# Patient Record
Sex: MALE | Race: AMERICAN INDIAN OR ALASKA NATIVE | NOT HISPANIC OR LATINO | Employment: FULL TIME | URBAN - NONMETROPOLITAN AREA
[De-identification: names, ages, dates, MRNs, and addresses within clinical notes are randomized per-mention and may not be internally consistent; named-entity substitution may affect disease eponyms.]

---

## 2017-09-28 ENCOUNTER — AMBULATORY - GICH (OUTPATIENT)
Dept: SCHEDULING | Facility: OTHER | Age: 67
End: 2017-09-28

## 2017-09-29 ENCOUNTER — HOSPITAL ENCOUNTER (OUTPATIENT)
Dept: LAB | Facility: OTHER | Age: 67
End: 2017-09-29
Attending: FAMILY MEDICINE | Admitting: FAMILY MEDICINE

## 2017-10-26 ENCOUNTER — HISTORY (OUTPATIENT)
Dept: EMERGENCY MEDICINE | Facility: OTHER | Age: 67
End: 2017-10-26

## 2017-10-30 ENCOUNTER — AMBULATORY - GICH (OUTPATIENT)
Dept: SCHEDULING | Facility: OTHER | Age: 67
End: 2017-10-30

## 2017-10-31 ENCOUNTER — HISTORY (OUTPATIENT)
Dept: ORTHOPEDICS | Facility: OTHER | Age: 67
End: 2017-10-31

## 2017-10-31 ENCOUNTER — OFFICE VISIT - GICH (OUTPATIENT)
Dept: ORTHOPEDICS | Facility: OTHER | Age: 67
End: 2017-10-31

## 2017-10-31 DIAGNOSIS — M75.02 ADHESIVE CAPSULITIS OF LEFT SHOULDER: ICD-10-CM

## 2017-12-27 NOTE — PROGRESS NOTES
Patient Information     Patient Name MRN Sex Faisal Reyna [id number] Male 1950      Progress Notes by Mike Dill DO at 10/31/2017  9:45 AM     Author:  Mike Dill DO Service:  (none) Author Type:  Physician     Filed:  10/31/2017 10:29 AM Encounter Date:  10/31/2017 Status:  Signed     :  Mike Dill DO (Physician)            Faisal Muñoz was seen in consultation for Dr. Webb for a chief complaint of pain into his left shoulder.    CHIEF COMPLAINT: Faisal Muñoz is a 67 y.o.  male  Chief Complaint     Patient presents with       Consult      left shoulder       HISTORY OF PRESENTING INJURY   History of presenting injury, patient awoke one morning with increased pain in the neck region. This is about 3 weeks ago. It started feeling weak along that area. It was bothering him so much she went to the [facility] in hospital multiple times and then eventually came to our ER. He was given some pain medications at that time and he states that those helped. I was able to get his notes from the [facility] and ER as well as our years for my review. He doesn't recall any injury but he did have surgery in  by Dr. Li. He feels like the shoulder is tightening up.  Description of pain:  moderate aching and discomfort   Radiation of pain: no  Pain course: stable  Worse with: overhead reaching, pushing and pulling  Improved by: OTC meds, Rx meds and rest  History of injection: No  Any PT: Yes    PAST MEDICAL HISTORY:  History of diabetes.    PAST SURGICAL HISTORY:  Recent back surgery, cataract surgery, cardiac stent placement, shoulder surgery on the left.    ORTHOPEDIC FRACTURES AND BROKEN BONES:  As above.    ALLERGIES:  No Known Allergies    CURRENT MEDICATIONS:  Current Outpatient Prescriptions       Medication  Sig Dispense Refill     amLODIPine-benazepril (LOTREL) 10-20 mg capsule Take 10-20 capsules by mouth once daily.       aspirin  "(ASPIR-81) 81 mg enteric coated tablet Take 81 mg by mouth once daily with a meal.       atorvastatin (LIPITOR) 80 mg tablet Take 80 mg by mouth once daily.       blood sugar diagnostic (ACCU-CHEK DANY PLUS TEST STRP) strip As directed.       blood-glucose meter (BLOOD GLUCOSE MONITORING) As directed.       clopidogrel (PLAVIX) 75 mg tablet Take 75 mg by mouth once daily.       gabapentin (NEURONTIN) 300 mg capsule Take 300 mg by mouth 3 times daily.       HYDROcodone-acetaminophen, 5-325 mg, (NORCO) per tablet Take 1-2 tablets by mouth every 4 hours if needed  for Pain Max acetaminophen dose: 4000mg in 24 hrs. 15 tablet 0     LANTUS SOLOSTAR 100 unit/mL (3 mL) pen Inject 28 Units subcutaneous before bedtime. Product desired:LANTUS       metFORMIN (GLUCOPHAGE) 500 mg tablet Take 500 mg by mouth 2 times daily with meals.       metoprolol succinate (TOPROL XL) 50 mg sustained-release tablet Take 50 mg by mouth once daily.       nitroglycerin (NITROSTAT) 0.4 mg sublingual tablet Place 0.4 mg under the tongue every 5 minutes if needed for Chest Pain.       No current facility-administered medications for this visit.      Medications have been reviewed by me and are current to the best of my knowledge and ability.      SOCIAL HISTORY:  Marital Status:   Children: Yes  Occupation: He works at [employer]  Alcohol use:  No  Tobacco use: Smoker: no  Are you or have you used illicit drugs:  no    FAMILY HISTORY:  Parents have passed away history of diabetes, heart disease and hypertension.    REVIEW OF SYSTEMS:  The review of systems as documented in the HPI and on the intake questionnaire, completed by the patient on 10/31/2017 have been reviewed by myself and the pertinent positives and negatives addressed.  The remainder of the complete review of systems was non-contributory.    PHYSICAL EXAM:   /70  Pulse 80  Ht 1.905 m (6' 3\")  Wt 104.3 kg (230 lb)  BMI 28.75 kg/m2 Body mass index is 28.75 " kg/(m^2).    General Appearance: Pleasant male in good appearance, mood and affect.  Alert and orientated times three ( time, date and location).    Skin: Abnormal, well-healed surgical incision sites.    Shoulder:  Motion: Patient struggles with motion up to about 90  passively I can stretch him to 120 with expected discomfort. There is a tightness to his capsule and try to go past that. He struggles with all other motions.  Hawkin's Sign: positive  Neer's Sign: positive  Cross body adduction:  positive  Drop arm test: negative  Weakness at waist level: negative  Bicipital testing: positive  Lift off test:  negative  Velez's test:  negative    Elbow:  Flexion: Normal  Extension: Normal    Hand:  Sensation: Normal  Radial and ulnar blood flow:  Normal    Eyes: Pupils are round.    Ears: Hearing: Intact    Heart: Good capillary refill into his hands pulses are regular.    Lungs: Coarse breath sounds throughout.     Radiographic images from 11/28/13 where independently reviewed and discussed with the patient.      Xray:     X-rays demonstrated osteoarthritic changes about the distal clavicle.    MRI/MRA:    MR ARTHROGRAM OF THE LEFT SHOULDER, 11/27/2013  CLINICAL HISTORY: 63-year-old female with previous history of arthroscopic  acromioplasty and rotator cuff repair performed on 2/13/2009 now with  recurrent pain.  TECHNIQUE: After fluoroscopically guided administration of contrast in the  left shoulder, coronal T1 with fat saturation, T2 with fat saturation and  proton density; axial proton density with fat saturation, T1 with fat  saturation and T2; sagittal T1 with fat saturation and proton density.  FINDINGS: The study is compared to the previous from 12/30/2011.  Moderate degenerative changes are seen about the AC joint including  undersurface spurring, which impinges upon the supraspinatus myotendinous  junction. The midportion and posterior portions of the supraspinatus  tendon are attenuated, and there is  extravasation of a small volume of  contrast into the subacromial space, consistent with focal perforations.  Additionally, there is slight delamination in the more distal fibers in  this region. This lies immediately adjacent to the previous rotator cuff  repair. The rotator cuff, however, otherwise appears to be normal in  overall contour, without evidence of a large tear, no evidence of  retraction. The supraspinatus and infraspinatus muscles demonstrate no  suggestion of acute or subacute abnormality. Mild decrease in expected  volume is seen in the supraspinatus, however, there is no evidence of  significant fatty replacement.  The biceps labral anchor appears intact. Mild tendinosis is seen within  the long head of the biceps tendon as it passes in the bicipital groove.  Subscapularis muscle and tendon are intact aside from mild tendinosis.  Teres minor muscle and tendon are intact.  The anterior and posterior aspects of the labrum appear to be normal in  contour. There is slight irregularity suggesting mild degenerative fraying  involving the cephalad and anterior-superior aspects of the labrum.  Articular cartilage of the glenoid and humeral head appear essentially  normal in overall thickness. There is, however, mild thinning along the  peripheral aspects of the joint space near the insertion of the  supraspinatus and infraspinatus tendons. Neurovascular structures appear  to be grossly normal.  Impression:    1. Essentially intact previous rotator cuff repair, associated with mild  tendinosis involving the posterior fibers of the supraspinatus, associated  with focal full-thickness perforations, resulting in slight extravasation  of contrast into the subacromial space.  2. Osteophytic spurring along the undersurface of the distal clavicle  imparts slight mass effect upon the supraspinatus myotendinous junction.  3. Mild degenerative fraying suggested involving the cephalad and  anterior-superior aspects of  the labrum.  4. Slight decrease in muscle bulk of the supraspinatus without evidence of  significant fatty replacement.  5. Mild tendinosis of the subscapularis tendon as well as the long head of  the biceps tendon.  Romie Lowe M.D.  [facility].  MTS/jsy  D:11/27/2013 T: 11/27/2013  Electronically signed by: ROMIE LOWE MD on 11/28/2013 7:25 AM CST    IMPRESSION:  History of previous left rotator cuff repair with Dr. Li in 2009 with MRI report from 2013 showing some atrophy and changes.  History of opioid agreement.  Adhesive capsulitis left shoulder.  Acromioclavicular arthritis.    PLAN:  Risks, benefits, conservative, surgical and alternatives to treatment where discussed and the patient would like to proceed with conservative measures.  I had a very long discussion with the patient and he understands that I do not prescribe narcotics illicit perform surgery or there is a fracture he verbalizes understanding.  He already has an appointment with medical orthopedics at [place] he will keep that appointment possible injections would be warranted.  Physical therapy would help order was placed.  He was instructed on wall walking.  He was given a handout on adhesive capsulitis he is at its and increased risk due to the fact that he has diabetes.  Questions and concerns answered.  He will follow up with the [place] group.    Mike Dill D.O.  Orthopaedic Surgeon    [facility]  [address] [place] [address]  Phone [phone] (KNEE)  Fax [phone]    This document was created using computer generated templates and voice activated software.    10:20 AM 10/31/2017

## 2017-12-30 NOTE — NURSING NOTE
Patient Information     Patient Name MRN Sex Faisal Reyna [id number] Male 1950      Nursing Note by Vale De León at 10/31/2017  9:45 AM     Author:  Vale De León Service:  (none) Author Type:  (none)     Filed:  10/31/2017  9:55 AM Encounter Date:  10/31/2017 Status:  Signed     :  Vale De León            Patient is here for a consult on his left shoulder pain.  Vale De León LPN .......10/31/2017 9:55 AM

## 2018-01-27 VITALS
HEART RATE: 80 BPM | WEIGHT: 230 LBS | SYSTOLIC BLOOD PRESSURE: 136 MMHG | DIASTOLIC BLOOD PRESSURE: 70 MMHG | BODY MASS INDEX: 28.6 KG/M2 | HEIGHT: 75 IN

## 2018-02-15 ENCOUNTER — DOCUMENTATION ONLY (OUTPATIENT)
Dept: FAMILY MEDICINE | Facility: OTHER | Age: 68
End: 2018-02-15

## 2018-02-15 PROBLEM — M75.02 ADHESIVE CAPSULITIS OF LEFT SHOULDER: Status: ACTIVE | Noted: 2017-10-31

## 2018-02-15 RX ORDER — AMLODIPINE AND BENAZEPRIL HYDROCHLORIDE 10; 20 MG/1; MG/1
10-20 CAPSULE ORAL DAILY
COMMUNITY

## 2018-02-15 RX ORDER — NITROGLYCERIN 0.4 MG/1
0.4 TABLET SUBLINGUAL EVERY 5 MIN PRN
COMMUNITY

## 2018-02-15 RX ORDER — CLOPIDOGREL BISULFATE 75 MG/1
75 TABLET ORAL DAILY
COMMUNITY

## 2018-02-15 RX ORDER — ATORVASTATIN CALCIUM 80 MG/1
80 TABLET, FILM COATED ORAL DAILY
COMMUNITY

## 2018-02-15 RX ORDER — METOPROLOL SUCCINATE 50 MG/1
50 TABLET, EXTENDED RELEASE ORAL DAILY
COMMUNITY

## 2018-02-15 RX ORDER — INSULIN PUMP SYRINGE, 3 ML
EACH MISCELLANEOUS
COMMUNITY

## 2018-02-15 RX ORDER — GABAPENTIN 300 MG/1
300 CAPSULE ORAL 3 TIMES DAILY
COMMUNITY

## 2018-02-15 RX ORDER — ASPIRIN 81 MG/1
81 TABLET ORAL
COMMUNITY

## 2018-02-15 RX ORDER — HYDROCODONE BITARTRATE AND ACETAMINOPHEN 5; 325 MG/1; MG/1
1-2 TABLET ORAL EVERY 4 HOURS PRN
COMMUNITY
Start: 2017-10-26 | End: 2023-10-05

## 2020-10-07 ENCOUNTER — HOSPITAL ENCOUNTER (EMERGENCY)
Facility: OTHER | Age: 70
Discharge: HOME | End: 2020-10-07
Payer: COMMERCIAL

## 2020-10-07 VITALS
HEART RATE: 78 BPM | WEIGHT: 236 LBS | BODY MASS INDEX: 29.34 KG/M2 | DIASTOLIC BLOOD PRESSURE: 76 MMHG | OXYGEN SATURATION: 97 % | HEIGHT: 75 IN | SYSTOLIC BLOOD PRESSURE: 154 MMHG | RESPIRATION RATE: 16 BRPM | TEMPERATURE: 98.3 F

## 2020-10-07 ASSESSMENT — MIFFLIN-ST. JEOR: SCORE: 1911.12

## 2020-10-07 NOTE — ED TRIAGE NOTES
Pt states that he has arthritis in his left hip and gets yearly injections in hip.  Pt states Tuesday morning he was having extreme pain.  Pt states that he is going to set up an appt. On this Monday but needs pain medication until then.  Pt has been using OTC medications but is not helping.

## 2021-09-11 ENCOUNTER — APPOINTMENT (OUTPATIENT)
Dept: GENERAL RADIOLOGY | Facility: OTHER | Age: 71
End: 2021-09-11
Attending: PHYSICIAN ASSISTANT
Payer: COMMERCIAL

## 2021-09-11 ENCOUNTER — HOSPITAL ENCOUNTER (EMERGENCY)
Facility: OTHER | Age: 71
Discharge: HOME | End: 2021-09-11
Attending: PHYSICIAN ASSISTANT | Admitting: PHYSICIAN ASSISTANT
Payer: COMMERCIAL

## 2021-09-11 VITALS
SYSTOLIC BLOOD PRESSURE: 140 MMHG | HEART RATE: 75 BPM | RESPIRATION RATE: 16 BRPM | TEMPERATURE: 98.2 F | DIASTOLIC BLOOD PRESSURE: 70 MMHG | OXYGEN SATURATION: 99 % | WEIGHT: 245 LBS | BODY MASS INDEX: 30.46 KG/M2 | HEIGHT: 75 IN

## 2021-09-11 DIAGNOSIS — K29.00 OTHER ACUTE GASTRITIS WITHOUT HEMORRHAGE: ICD-10-CM

## 2021-09-11 DIAGNOSIS — K59.01 SLOW TRANSIT CONSTIPATION: ICD-10-CM

## 2021-09-11 PROBLEM — I20.89 ANGINA OF EFFORT (H): Status: ACTIVE | Noted: 2021-04-02

## 2021-09-11 PROBLEM — Z87.891 FORMER SMOKER: Status: ACTIVE | Noted: 2019-06-19

## 2021-09-11 PROBLEM — Z79.4 CONTROLLED TYPE 2 DIABETES MELLITUS WITH CIRCULATORY DISORDER, WITH LONG-TERM CURRENT USE OF INSULIN (H): Status: ACTIVE | Noted: 2017-04-05

## 2021-09-11 PROBLEM — Z95.5 S/P DRUG ELUTING CORONARY STENT PLACEMENT: Status: ACTIVE | Noted: 2017-08-11

## 2021-09-11 PROBLEM — E11.59 CONTROLLED TYPE 2 DIABETES MELLITUS WITH CIRCULATORY DISORDER, WITH LONG-TERM CURRENT USE OF INSULIN (H): Status: ACTIVE | Noted: 2017-04-05

## 2021-09-11 PROCEDURE — 250N000013 HC RX MED GY IP 250 OP 250 PS 637: Performed by: PHYSICIAN ASSISTANT

## 2021-09-11 PROCEDURE — 99283 EMERGENCY DEPT VISIT LOW MDM: CPT | Performed by: PHYSICIAN ASSISTANT

## 2021-09-11 PROCEDURE — 74019 RADEX ABDOMEN 2 VIEWS: CPT

## 2021-09-11 RX ORDER — DOCUSATE SODIUM 100 MG/1
200 CAPSULE, LIQUID FILLED ORAL ONCE
Status: COMPLETED | OUTPATIENT
Start: 2021-09-11 | End: 2021-09-11

## 2021-09-11 RX ORDER — DOCUSATE SODIUM 100 MG/1
100 CAPSULE, LIQUID FILLED ORAL 2 TIMES DAILY
Qty: 10 CAPSULE | Refills: 0 | Status: SHIPPED | OUTPATIENT
Start: 2021-09-11 | End: 2021-09-16

## 2021-09-11 RX ORDER — ONDANSETRON 4 MG/1
4 TABLET, ORALLY DISINTEGRATING ORAL ONCE
Status: DISCONTINUED | OUTPATIENT
Start: 2021-09-11 | End: 2021-09-11 | Stop reason: HOSPADM

## 2021-09-11 RX ADMIN — MAGNESIUM HYDROXIDE 30 ML: 400 SUSPENSION ORAL at 15:06

## 2021-09-11 RX ADMIN — DOCUSATE SODIUM 200 MG: 100 CAPSULE, LIQUID FILLED ORAL at 15:06

## 2021-09-11 ASSESSMENT — ENCOUNTER SYMPTOMS
LIGHT-HEADEDNESS: 0
BACK PAIN: 0
NAUSEA: 1
VOMITING: 0
CHILLS: 0
FEVER: 0
TREMORS: 0
STRIDOR: 0
FLANK PAIN: 0
FACIAL ASYMMETRY: 0
EYE PAIN: 0
AGITATION: 0
SEIZURES: 0
ABDOMINAL PAIN: 1
FATIGUE: 0
FACIAL SWELLING: 0
APPETITE CHANGE: 1
CONFUSION: 0
DIZZINESS: 0

## 2021-09-11 ASSESSMENT — MIFFLIN-ST. JEOR: SCORE: 1946.94

## 2021-09-11 NOTE — ED TRIAGE NOTES
Patient presents to ED from home for report of upper abdominal pain that radiates to back that started after eating last night, was seen in [place] ED - was told he needed to have his gallbladder removed and needed IV antibiotics but decided to go home instead. Patient advised to go to [facility] in [place] for hospitalization but decided to come to [facility] to see if he could be treated here.

## 2021-11-04 ENCOUNTER — HOSPITAL ENCOUNTER (EMERGENCY)
Facility: OTHER | Age: 71
Discharge: LEFT BEFORE DISCHARGE | End: 2021-11-04
Attending: PHYSICIAN ASSISTANT | Admitting: PHYSICIAN ASSISTANT
Payer: COMMERCIAL

## 2021-11-04 ENCOUNTER — APPOINTMENT (OUTPATIENT)
Dept: ULTRASOUND IMAGING | Facility: OTHER | Age: 71
End: 2021-11-04
Attending: PHYSICIAN ASSISTANT
Payer: COMMERCIAL

## 2021-11-04 VITALS
TEMPERATURE: 100.5 F | OXYGEN SATURATION: 96 % | WEIGHT: 245 LBS | HEART RATE: 89 BPM | DIASTOLIC BLOOD PRESSURE: 77 MMHG | SYSTOLIC BLOOD PRESSURE: 132 MMHG | RESPIRATION RATE: 20 BRPM | BODY MASS INDEX: 30.62 KG/M2

## 2021-11-04 DIAGNOSIS — K83.09 CHOLANGITIS (H): ICD-10-CM

## 2021-11-04 DIAGNOSIS — R10.11 ABDOMINAL PAIN, RIGHT UPPER QUADRANT: ICD-10-CM

## 2021-11-04 DIAGNOSIS — U07.1 INFECTION DUE TO 2019 NOVEL CORONAVIRUS: ICD-10-CM

## 2021-11-04 LAB
ALBUMIN SERPL-MCNC: 4.2 G/DL (ref 3.5–5.7)
ALP SERPL-CCNC: 125 U/L (ref 34–104)
ALT SERPL W P-5'-P-CCNC: 82 U/L (ref 7–52)
ANION GAP SERPL CALCULATED.3IONS-SCNC: 11 MMOL/L (ref 3–14)
AST SERPL W P-5'-P-CCNC: 124 U/L (ref 13–39)
BASOPHILS # BLD AUTO: 0 10E3/UL (ref 0–0.2)
BASOPHILS NFR BLD AUTO: 0 %
BILIRUB DIRECT SERPL-MCNC: 1.4 MG/DL (ref 0–0.2)
BILIRUB SERPL-MCNC: 2.2 MG/DL (ref 0.3–1)
BILIRUB SERPL-MCNC: 2.3 MG/DL (ref 0.3–1)
BUN SERPL-MCNC: 23 MG/DL (ref 7–25)
CALCIUM SERPL-MCNC: 9.3 MG/DL (ref 8.6–10.3)
CHLORIDE BLD-SCNC: 99 MMOL/L (ref 98–107)
CO2 SERPL-SCNC: 23 MMOL/L (ref 21–31)
CREAT SERPL-MCNC: 1.32 MG/DL (ref 0.7–1.3)
EOSINOPHIL # BLD AUTO: 0.1 10E3/UL (ref 0–0.7)
EOSINOPHIL NFR BLD AUTO: 1 %
ERYTHROCYTE [DISTWIDTH] IN BLOOD BY AUTOMATED COUNT: 13.3 % (ref 10–15)
FLUAV RNA SPEC QL NAA+PROBE: NEGATIVE
FLUBV RNA RESP QL NAA+PROBE: NEGATIVE
GFR SERPL CREATININE-BSD FRML MDRD: 54 ML/MIN/1.73M2
GLUCOSE BLD-MCNC: 142 MG/DL (ref 70–105)
HCT VFR BLD AUTO: 37.5 % (ref 40–53)
HGB BLD-MCNC: 12.9 G/DL (ref 13.3–17.7)
IMM GRANULOCYTES # BLD: 0.1 10E3/UL
IMM GRANULOCYTES NFR BLD: 1 %
LACTATE SERPL-SCNC: 1.6 MMOL/L (ref 0.7–2)
LIPASE SERPL-CCNC: 19 U/L (ref 11–82)
LYMPHOCYTES # BLD AUTO: 0.6 10E3/UL (ref 0.8–5.3)
LYMPHOCYTES NFR BLD AUTO: 5 %
MCH RBC QN AUTO: 29.5 PG (ref 26.5–33)
MCHC RBC AUTO-ENTMCNC: 34.4 G/DL (ref 31.5–36.5)
MCV RBC AUTO: 86 FL (ref 78–100)
MONOCYTES # BLD AUTO: 0.7 10E3/UL (ref 0–1.3)
MONOCYTES NFR BLD AUTO: 6 %
NEUTROPHILS # BLD AUTO: 10.7 10E3/UL (ref 1.6–8.3)
NEUTROPHILS NFR BLD AUTO: 87 %
NRBC # BLD AUTO: 0 10E3/UL
NRBC BLD AUTO-RTO: 0 /100
PLATELET # BLD AUTO: 246 10E3/UL (ref 150–450)
POTASSIUM BLD-SCNC: 4.1 MMOL/L (ref 3.5–5.1)
PROT SERPL-MCNC: 7.4 G/DL (ref 6.4–8.9)
RBC # BLD AUTO: 4.37 10E6/UL (ref 4.4–5.9)
RSV RNA SPEC NAA+PROBE: NEGATIVE
SARS-COV-2 RNA RESP QL NAA+PROBE: POSITIVE
SODIUM SERPL-SCNC: 133 MMOL/L (ref 134–144)
TROPONIN I SERPL-MCNC: 4.6 PG/ML (ref 0–34)
WBC # BLD AUTO: 12.1 10E3/UL (ref 4–11)

## 2021-11-04 PROCEDURE — 93005 ELECTROCARDIOGRAM TRACING: CPT | Performed by: PHYSICIAN ASSISTANT

## 2021-11-04 PROCEDURE — 83605 ASSAY OF LACTIC ACID: CPT | Performed by: PHYSICIAN ASSISTANT

## 2021-11-04 PROCEDURE — 83690 ASSAY OF LIPASE: CPT | Performed by: PHYSICIAN ASSISTANT

## 2021-11-04 PROCEDURE — 82247 BILIRUBIN TOTAL: CPT | Mod: XU | Performed by: PHYSICIAN ASSISTANT

## 2021-11-04 PROCEDURE — 87637 SARSCOV2&INF A&B&RSV AMP PRB: CPT | Performed by: PHYSICIAN ASSISTANT

## 2021-11-04 PROCEDURE — 93010 ELECTROCARDIOGRAM REPORT: CPT | Performed by: INTERNAL MEDICINE

## 2021-11-04 PROCEDURE — 85004 AUTOMATED DIFF WBC COUNT: CPT | Performed by: PHYSICIAN ASSISTANT

## 2021-11-04 PROCEDURE — 250N000011 HC RX IP 250 OP 636: Performed by: PHYSICIAN ASSISTANT

## 2021-11-04 PROCEDURE — 82040 ASSAY OF SERUM ALBUMIN: CPT | Performed by: PHYSICIAN ASSISTANT

## 2021-11-04 PROCEDURE — 99284 EMERGENCY DEPT VISIT MOD MDM: CPT | Performed by: PHYSICIAN ASSISTANT

## 2021-11-04 PROCEDURE — 76705 ECHO EXAM OF ABDOMEN: CPT

## 2021-11-04 PROCEDURE — 96365 THER/PROPH/DIAG IV INF INIT: CPT | Performed by: PHYSICIAN ASSISTANT

## 2021-11-04 PROCEDURE — 36415 COLL VENOUS BLD VENIPUNCTURE: CPT | Performed by: PHYSICIAN ASSISTANT

## 2021-11-04 PROCEDURE — C9803 HOPD COVID-19 SPEC COLLECT: HCPCS | Performed by: PHYSICIAN ASSISTANT

## 2021-11-04 PROCEDURE — 258N000003 HC RX IP 258 OP 636: Performed by: PHYSICIAN ASSISTANT

## 2021-11-04 PROCEDURE — 87040 BLOOD CULTURE FOR BACTERIA: CPT | Performed by: PHYSICIAN ASSISTANT

## 2021-11-04 PROCEDURE — 99285 EMERGENCY DEPT VISIT HI MDM: CPT | Mod: 25 | Performed by: PHYSICIAN ASSISTANT

## 2021-11-04 PROCEDURE — 96375 TX/PRO/DX INJ NEW DRUG ADDON: CPT | Performed by: PHYSICIAN ASSISTANT

## 2021-11-04 PROCEDURE — 84484 ASSAY OF TROPONIN QUANT: CPT | Performed by: PHYSICIAN ASSISTANT

## 2021-11-04 RX ORDER — KETOROLAC TROMETHAMINE 15 MG/ML
15 INJECTION, SOLUTION INTRAMUSCULAR; INTRAVENOUS ONCE
Status: COMPLETED | OUTPATIENT
Start: 2021-11-04 | End: 2021-11-04

## 2021-11-04 RX ORDER — METRONIDAZOLE 500 MG/1
500 TABLET ORAL 2 TIMES DAILY
Qty: 14 TABLET | Refills: 0 | Status: SHIPPED | OUTPATIENT
Start: 2021-11-04 | End: 2021-11-11

## 2021-11-04 RX ORDER — ONDANSETRON 2 MG/ML
4 INJECTION INTRAMUSCULAR; INTRAVENOUS ONCE
Status: COMPLETED | OUTPATIENT
Start: 2021-11-04 | End: 2021-11-04

## 2021-11-04 RX ORDER — CIPROFLOXACIN 500 MG/1
500 TABLET, FILM COATED ORAL 2 TIMES DAILY
Qty: 14 TABLET | Refills: 0 | Status: SHIPPED | OUTPATIENT
Start: 2021-11-04 | End: 2021-11-11

## 2021-11-04 RX ORDER — HYDROMORPHONE HYDROCHLORIDE 1 MG/ML
0.5 INJECTION, SOLUTION INTRAMUSCULAR; INTRAVENOUS; SUBCUTANEOUS ONCE
Status: COMPLETED | OUTPATIENT
Start: 2021-11-04 | End: 2021-11-04

## 2021-11-04 RX ADMIN — SODIUM CHLORIDE 1000 ML: 9 INJECTION, SOLUTION INTRAVENOUS at 15:04

## 2021-11-04 RX ADMIN — HYDROMORPHONE HYDROCHLORIDE 0.5 MG: 1 INJECTION, SOLUTION INTRAMUSCULAR; INTRAVENOUS; SUBCUTANEOUS at 15:04

## 2021-11-04 RX ADMIN — ONDANSETRON 4 MG: 2 INJECTION INTRAMUSCULAR; INTRAVENOUS at 15:05

## 2021-11-04 RX ADMIN — TAZOBACTAM SODIUM AND PIPERACILLIN SODIUM 3.38 G: 375; 3 INJECTION, SOLUTION INTRAVENOUS at 16:48

## 2021-11-04 RX ADMIN — KETOROLAC TROMETHAMINE 15 MG: 15 INJECTION, SOLUTION INTRAMUSCULAR; INTRAVENOUS at 15:05

## 2021-11-04 ASSESSMENT — ENCOUNTER SYMPTOMS
CHILLS: 0
SHORTNESS OF BREATH: 0
WOUND: 0
ABDOMINAL PAIN: 1
BRUISES/BLEEDS EASILY: 0
HEMATURIA: 0
FEVER: 0
CONFUSION: 0
CHEST TIGHTNESS: 0
BACK PAIN: 0

## 2021-11-04 NOTE — DISCHARGE INSTRUCTIONS
Get plenty of fluids and rest.  Be aware you are leaving AGAINST MEDICAL ADVICE.  We did discuss that there is concern for worsening infection called cholangitis.  Take your antibiotics as directed, I did place referral for you to obtain an MRI of your abdomen for further evaluation as well as a referral to follow-up with general surgeon for reassessment.  Please return the ED if there are greatly worsening or concerning symptoms.  You can take ibuprofen to help with your fever and comfort control.

## 2021-11-04 NOTE — ED PROVIDER NOTES
History     Chief Complaint   Patient presents with     Abdominal Pain     HPI  Faisal Muñoz is a 71 year old male who presents to the ED for evaluation abdominal pain. history of constant midline abdominal pain reported by patient. Was seen on labor day and told he had congestion between liver and gallbladder, told to see a surgeon to get gallbladder out. He has an appointment for a consult soon. Severe pain started at 10 am.  Significant symptoms had onset 5 hour(s) ago.    Allergies:  No Known Allergies    Problem List:    Patient Active Problem List    Diagnosis Date Noted     Angina of effort (H) 04/02/2021     Priority: Medium     Formatting of this note might be different from the original.  Added automatically from request for surgery [id number]       Former smoker 06/19/2019     Priority: Medium     Formatting of this note might be different from the original.  Smoked < 1ppd for 50 years. Pack used to last a week until fall 2016 when started to smoke heavily x 1 month. Then quit cold turkey. Hasn't smoked since.       Adhesive capsulitis of left shoulder 10/31/2017     Priority: Medium     S/P drug eluting coronary stent placement 08/11/2017     Priority: Medium     Formatting of this note might be different from the original.   PCI of distal RCA into PDA with a 2.75 x 18 mm Resolute CRISTOFER; mid RCA stented wtih a 3.0 x 26 mm Resolute CRISTOFER; large PLVB dilated with a 3.0 x 15 mm balloon with a nice result.  He had normal LVEDP. Plavix and ASA x 1 year. ASA indefinitely.       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  IMO Update  Updated per  10/1/17 IMO import  Replacing diagnoses that were inactivated after the 10/1/2021 regulatory import.       YOUNG (nonalcoholic steatohepatitis) 2012     Priority: Medium     Formatting of this note might be different from the original.  IMO Update 10/11       Essential hypertension 2006     Priority: Medium     Formatting of this note might be different from the original.  DRHC/ER -          Past Medical History:    Past Medical History:   Diagnosis Date     Adhesive capsulitis of left shoulder        Past Surgical History:    No past surgical history on file.    Family History:    No family history on file.    Social History:  Marital Status:   [2]  Social History     Tobacco Use     Smoking status: Former Smoker     Quit date: 10/2/2016     Years since quittin.0     Smokeless tobacco: Never Used   Substance Use Topics     Alcohol use: No     Alcohol/week: 0.0 standard drinks     Drug use: Unknown     Types: Other     Comment: Drug use: No        Medications:    ciprofloxacin (CIPRO) 500 MG tablet  metroNIDAZOLE (FLAGYL) 500 MG tablet  amLODIPine-benazepril (LOTREL) 10-20 MG per capsule  aspirin EC 81 MG EC tablet  atorvastatin (LIPITOR) 80 MG tablet  blood glucose monitoring (NO BRAND SPECIFIED) test strip  Blood Glucose Monitoring Suppl (FIFTY50 GLUCOSE METER 2.0) W/DEVICE KIT  clopidogrel (PLAVIX) 75 MG tablet  gabapentin (NEURONTIN) 300 MG capsule  HYDROcodone-acetaminophen (NORCO) 5-325 MG per tablet  insulin glargine (LANTUS SOLOSTAR) 100 UNIT/ML injection  metFORMIN (GLUCOPHAGE) 500 MG tablet  metoprolol succinate (TOPROL-XL) 50 MG 24 hr tablet  nitroGLYcerin (NITROSTAT) 0.4 MG sublingual tablet          Review of Systems   Constitutional: Negative for chills and fever.   HENT: Negative for congestion.    Eyes: Negative for visual disturbance.   Respiratory: Negative for chest tightness and shortness of breath.    Cardiovascular: Negative for chest pain.   Gastrointestinal: Positive for  abdominal pain.   Genitourinary: Negative for hematuria.   Musculoskeletal: Negative for back pain.   Skin: Negative for rash and wound.   Neurological: Negative for syncope.   Hematological: Does not bruise/bleed easily.   Psychiatric/Behavioral: Negative for confusion.       Physical Exam   BP: 134/68  Pulse: 89  Temp: (!) 101.5  F (38.6  C)  Resp: 20  Weight: 111.1 kg (245 lb)  SpO2: 96 %      Physical Exam  Constitutional:       General: He is not in acute distress.     Appearance: He is well-developed. He is not diaphoretic.   HENT:      Head: Normocephalic and atraumatic.   Eyes:      General: No scleral icterus.     Conjunctiva/sclera: Conjunctivae normal.   Cardiovascular:      Rate and Rhythm: Normal rate and regular rhythm.   Pulmonary:      Effort: Pulmonary effort is normal.      Breath sounds: Normal breath sounds.   Abdominal:      Palpations: Abdomen is soft.      Tenderness: There is abdominal tenderness in the right upper quadrant and periumbilical area.   Musculoskeletal:         General: No deformity.      Cervical back: Neck supple.   Lymphadenopathy:      Cervical: No cervical adenopathy.   Skin:     General: Skin is warm and dry.      Coloration: Skin is not jaundiced.      Findings: No rash.   Neurological:      Mental Status: He is alert and oriented to person, place, and time. Mental status is at baseline.   Psychiatric:         Mood and Affect: Mood normal.         Behavior: Behavior normal.         ED Course     ED Course as of Nov 04 2208   Thu Nov 04, 2021 1714 SARS CoV2 PCR(!): Positive     Procedures         EKG read at 1615. HR 92, NSR, no ST changes.     Critical Care time:  none               Results for orders placed or performed during the hospital encounter of 11/04/21 (from the past 24 hour(s))   CBC with platelets differential    Narrative    The following orders were created for panel order CBC with platelets differential.  Procedure                               Abnormality          Status                     ---------                               -----------         ------                     CBC with platelets and d...[[id number]]  Abnormal            Final result                 Please view results for these tests on the individual orders.   Comprehensive metabolic panel   Result Value Ref Range    Sodium 133 (L) 134 - 144 mmol/L    Potassium 4.1 3.5 - 5.1 mmol/L    Chloride 99 98 - 107 mmol/L    Carbon Dioxide (CO2) 23 21 - 31 mmol/L    Anion Gap 11 3 - 14 mmol/L    Urea Nitrogen 23 7 - 25 mg/dL    Creatinine 1.32 (H) 0.70 - 1.30 mg/dL    Calcium 9.3 8.6 - 10.3 mg/dL    Glucose 142 (H) 70 - 105 mg/dL    Alkaline Phosphatase 125 (H) 34 - 104 U/L     (H) 13 - 39 U/L    ALT 82 (H) 7 - 52 U/L    Protein Total 7.4 6.4 - 8.9 g/dL    Albumin 4.2 3.5 - 5.7 g/dL    Bilirubin Total 2.2 (H) 0.3 - 1.0 mg/dL    GFR Estimate 54 (L) >60 mL/min/1.73m2   Lipase   Result Value Ref Range    Lipase 19 11 - 82 U/L   Lactic acid whole blood   Result Value Ref Range    Lactic Acid 1.6 0.7 - 2.0 mmol/L   Troponin I   Result Value Ref Range    Troponin I 4.6 0.0 - 34.0 pg/mL   CBC with platelets and differential   Result Value Ref Range    WBC Count 12.1 (H) 4.0 - 11.0 10e3/uL    RBC Count 4.37 (L) 4.40 - 5.90 10e6/uL    Hemoglobin 12.9 (L) 13.3 - 17.7 g/dL    Hematocrit 37.5 (L) 40.0 - 53.0 %    MCV 86 78 - 100 fL    MCH 29.5 26.5 - 33.0 pg    MCHC 34.4 31.5 - 36.5 g/dL    RDW 13.3 10.0 - 15.0 %    Platelet Count 246 150 - 450 10e3/uL    % Neutrophils 87 %    % Lymphocytes 5 %    % Monocytes 6 %    % Eosinophils 1 %    % Basophils 0 %    % Immature Granulocytes 1 %    NRBCs per 100 WBC 0 <1 /100    Absolute Neutrophils 10.7 (H) 1.6 - 8.3 10e3/uL    Absolute Lymphocytes 0.6 (L) 0.8 - 5.3 10e3/uL    Absolute Monocytes 0.7 0.0 - 1.3 10e3/uL    Absolute Eosinophils 0.1 0.0 - 0.7 10e3/uL    Absolute Basophils 0.0 0.0 - 0.2 10e3/uL    Absolute Immature Granulocytes 0.1 (H) <=0.0 10e3/uL    Absolute NRBCs  0.0 10e3/uL   US Abdomen Limited    Narrative    Exam: US ABDOMEN LIMITED    Exam reason: RUQ abdominal pain, fever. gallbladder?    Technique: Grayscale ultrasound images of the abdomen were obtained.    Comparison: Abdomen radiograph on 9/11/2021    FINDINGS:    Aorta: Not well visualized.     Inferior Vena Cava: Not well visualized.    Pancreas: Not well visualized.    Liver: Normal size and echogenicity. No focal solid masses. Liver  surface is normal.      Gallbladder:  No gallstones. There is mild gallbladder wall  thickening. No pericholecystic fluid.  No focal tenderness was present  over the gallbladder.    Biliary Ducts: Common bile duct (CBD) is 12 mm. No intraluminal stone  or mass is demonstrated.  No intrahepatic ductal dilatation.    Right kidney: 11.1 cm long. The right kidney is normal in size and  echogenicity. No solid masses, calculi or hydronephrosis.                 Impression    IMPRESSION:     There is mild gallbladder wall thickening. No gallstones or focal  tenderness over the gallbladder.    Common bile duct measures 12 mm in diameter. No intraluminal stone or  mass is demonstrated.    DARIANA JACKSON MD         SYSTEM ID:  [id number]   Symptomatic Influenza A/B & SARS-CoV2 (COVID-19) Virus PCR Multiplex Nose    Specimen: Nose; Swab   Result Value Ref Range    Influenza A target Negative Negative    Influenza B target Negative Negative    RSV target Negative Negative    SARS CoV2 PCR Positive (A) Negative    Narrative    Testing was performed using the Xpert Xpress CoV2/Flu/RSV Assay on the [employer] GeneXpert Instrument. This test should be ordered for the detection of SARS-CoV-2 and influenza viruses in individuals who meet clinical and/or epidemiological criteria. Test performance is unknown in asymptomatic patients. This test is for in vitro diagnostic use under the FDA EUA for laboratories certified under CLIA to perform high or moderate complexity testing. This test has not been FDA  cleared or approved. A negative result does not rule out the presence of PCR inhibitors in the specimen or target RNA in concentration below the limit of detection for the assay. If only one viral target is positive but coinfection with multiple targets is suspected, the sample should be re-tested with another FDA cleared, approved, or authorized test, if coinfection would change clinical management. This test was validated by the [facility]. These laboratories are certified under the Clinical  Laboratory Improvement Amendments of 1988 (CLIA-88) as qualified to perform high complexity laboratory testing.   Bilirubin Direct and Total   Result Value Ref Range    Bilirubin Direct 1.4 (H) 0.0 - 0.2 mg/dL    Bilirubin Total 2.3 (H) 0.3 - 1.0 mg/dL       Medications   0.9% sodium chloride BOLUS (0 mLs Intravenous Stopped 11/4/21 1740)   ketorolac (TORADOL) injection 15 mg (15 mg Intravenous Given 11/4/21 1505)   HYDROmorphone (PF) (DILAUDID) injection 0.5 mg (0.5 mg Intravenous Given 11/4/21 1504)   ondansetron (ZOFRAN) injection 4 mg (4 mg Intravenous Given 11/4/21 1505)   piperacillin-tazobactam (ZOSYN) infusion 3.375 g (0 g Intravenous Stopped 11/4/21 1740)       Assessments & Plan (with Medical Decision Making)   Nontoxic but appears uncomfortable. Heart, lung, bowel sounds normal. Abd soft but with some generalized abdominal pain, increased in RUQ. He is febrile.     WBC 12.1, hemoglobin 12.9.  Does have some elevated LFTs and bilirubin is 2.2.    Troponin peers normal as does lipase.    US read as:  -There is mild gallbladder wall thickening. No gallstones or focal  tenderness over the gallbladder.     -Common bile duct measures 12 mm in diameter. No intraluminal stone or  mass is demonstrated.    I have concern for possible cholangitis and feel he would benefit from an ERCP not available at [facility]. Pt is accepted to [facility] in [place].  Unfortunately, he does not appear to be in positive Covid study  and [facility] is no longer able to accept him there.    I do discuss this with him and offered to call around to hospitals for transfer but he declines and says that he would like to just go home at this time.  He is aware of my concern for worsening infectious process such as cholangitis.  I did give him a dose of Zosyn.  He will sign out AGAINST MEDICAL ADVICE clearly his symptoms could deteriorate.  We will send him home on Cipro and Flagyl.  I also placed order for him to obtain an MRCP and follow-up with general surgery.  I did discuss case with Dr. Thompson, surgeon as well, who feels that given the patients unwillingness to stay that this is the best we can do.     He will continue with conservative treatment as well with regards to his COVID illness.     Strict return precautions are given to the pt, they will return if symptoms are worsening or concerning.    Arslan Villaseñor PA-C    I have reviewed the nursing notes.    I have reviewed the findings, diagnosis, plan and need for follow up with the patient.       Discharge Medication List as of 11/4/2021  5:41 PM          Final diagnoses:   Abdominal pain, right upper quadrant   Cholangitis   Infection due to 2019 novel coronavirus       11/4/2021   [facility]     Arslan Villaseñor PA  11/04/21 0650

## 2021-11-04 NOTE — ED TRIAGE NOTES
ED Nursing Triage Note (General)   ________________________________    Faisal Muñoz is a 71 year old Male that presents to triage private car with history of constant midline abdominal pain reported by patient. Was seen on labor day and told he had congestion between liver and gallbladder, told to see a surgeon to get gallbladder out. He has an appointment for a consult soon. Severe pain started at 10 am.  Significant symptoms had onset 5 hour(s) ago.  Pulse 89   Temp (!) 101.5  F (38.6  C) (Tympanic)   Resp 20   Wt 111.1 kg (245 lb)   SpO2 96%   BMI 30.62 kg/m  t  Patient appears alert , in severe acute distress., and cooperative behavior.    GCS Eye Opening = 4=Spontaneous  Airway: intact  Breathing noted as Normal  Circulation Normal  Skin:  Normal  Action taken:  [place] 6      PRE HOSPITAL PRIOR LIVING SITUATION Spouse

## 2021-11-05 ENCOUNTER — HOSPITAL ENCOUNTER (EMERGENCY)
Facility: OTHER | Age: 71
Discharge: ANOTHER HOSPITAL | End: 2021-11-06
Attending: STUDENT IN AN ORGANIZED HEALTH CARE EDUCATION/TRAINING PROGRAM | Admitting: STUDENT IN AN ORGANIZED HEALTH CARE EDUCATION/TRAINING PROGRAM
Payer: COMMERCIAL

## 2021-11-05 DIAGNOSIS — K83.09 CHOLANGITIS (H): ICD-10-CM

## 2021-11-05 LAB
ALBUMIN SERPL-MCNC: 4.2 G/DL (ref 3.5–5.7)
ALP SERPL-CCNC: 152 U/L (ref 34–104)
ALT SERPL W P-5'-P-CCNC: 189 U/L (ref 7–52)
ANION GAP SERPL CALCULATED.3IONS-SCNC: 13 MMOL/L (ref 3–14)
AST SERPL W P-5'-P-CCNC: 139 U/L (ref 13–39)
ATRIAL RATE - MUSE: 92 BPM
BASOPHILS # BLD AUTO: 0 10E3/UL (ref 0–0.2)
BASOPHILS NFR BLD AUTO: 0 %
BILIRUB SERPL-MCNC: 5.9 MG/DL (ref 0.3–1)
BUN SERPL-MCNC: 26 MG/DL (ref 7–25)
CALCIUM SERPL-MCNC: 9.6 MG/DL (ref 8.6–10.3)
CHLORIDE BLD-SCNC: 98 MMOL/L (ref 98–107)
CO2 SERPL-SCNC: 22 MMOL/L (ref 21–31)
CREAT SERPL-MCNC: 1.37 MG/DL (ref 0.7–1.3)
DIASTOLIC BLOOD PRESSURE - MUSE: NORMAL MMHG
EOSINOPHIL # BLD AUTO: 0 10E3/UL (ref 0–0.7)
EOSINOPHIL NFR BLD AUTO: 0 %
ERYTHROCYTE [DISTWIDTH] IN BLOOD BY AUTOMATED COUNT: 13.6 % (ref 10–15)
GFR SERPL CREATININE-BSD FRML MDRD: 51 ML/MIN/1.73M2
GLUCOSE BLD-MCNC: 177 MG/DL (ref 70–105)
HCT VFR BLD AUTO: 38.5 % (ref 40–53)
HGB BLD-MCNC: 13.3 G/DL (ref 13.3–17.7)
IMM GRANULOCYTES # BLD: 0.1 10E3/UL
IMM GRANULOCYTES NFR BLD: 0 %
INTERPRETATION ECG - MUSE: NORMAL
LACTATE SERPL-SCNC: 1.3 MMOL/L (ref 0.7–2)
LYMPHOCYTES # BLD AUTO: 0.5 10E3/UL (ref 0.8–5.3)
LYMPHOCYTES NFR BLD AUTO: 3 %
MCH RBC QN AUTO: 29.6 PG (ref 26.5–33)
MCHC RBC AUTO-ENTMCNC: 34.5 G/DL (ref 31.5–36.5)
MCV RBC AUTO: 86 FL (ref 78–100)
MONOCYTES # BLD AUTO: 1.1 10E3/UL (ref 0–1.3)
MONOCYTES NFR BLD AUTO: 8 %
NEUTROPHILS # BLD AUTO: 12.6 10E3/UL (ref 1.6–8.3)
NEUTROPHILS NFR BLD AUTO: 89 %
NRBC # BLD AUTO: 0 10E3/UL
NRBC BLD AUTO-RTO: 0 /100
P AXIS - MUSE: -23 DEGREES
PLATELET # BLD AUTO: 184 10E3/UL (ref 150–450)
POTASSIUM BLD-SCNC: 3.9 MMOL/L (ref 3.5–5.1)
PR INTERVAL - MUSE: 120 MS
PROT SERPL-MCNC: 7.6 G/DL (ref 6.4–8.9)
QRS DURATION - MUSE: 116 MS
QT - MUSE: 354 MS
QTC - MUSE: 437 MS
R AXIS - MUSE: -58 DEGREES
RBC # BLD AUTO: 4.49 10E6/UL (ref 4.4–5.9)
SODIUM SERPL-SCNC: 133 MMOL/L (ref 134–144)
SYSTOLIC BLOOD PRESSURE - MUSE: NORMAL MMHG
T AXIS - MUSE: 16 DEGREES
VENTRICULAR RATE- MUSE: 92 BPM
WBC # BLD AUTO: 14.1 10E3/UL (ref 4–11)

## 2021-11-05 PROCEDURE — 96366 THER/PROPH/DIAG IV INF ADDON: CPT | Performed by: STUDENT IN AN ORGANIZED HEALTH CARE EDUCATION/TRAINING PROGRAM

## 2021-11-05 PROCEDURE — 250N000011 HC RX IP 250 OP 636: Performed by: STUDENT IN AN ORGANIZED HEALTH CARE EDUCATION/TRAINING PROGRAM

## 2021-11-05 PROCEDURE — 250N000011 HC RX IP 250 OP 636: Performed by: EMERGENCY MEDICINE

## 2021-11-05 PROCEDURE — 258N000003 HC RX IP 258 OP 636: Performed by: STUDENT IN AN ORGANIZED HEALTH CARE EDUCATION/TRAINING PROGRAM

## 2021-11-05 PROCEDURE — 96365 THER/PROPH/DIAG IV INF INIT: CPT | Performed by: STUDENT IN AN ORGANIZED HEALTH CARE EDUCATION/TRAINING PROGRAM

## 2021-11-05 PROCEDURE — 80053 COMPREHEN METABOLIC PANEL: CPT | Performed by: STUDENT IN AN ORGANIZED HEALTH CARE EDUCATION/TRAINING PROGRAM

## 2021-11-05 PROCEDURE — 85025 COMPLETE CBC W/AUTO DIFF WBC: CPT | Performed by: STUDENT IN AN ORGANIZED HEALTH CARE EDUCATION/TRAINING PROGRAM

## 2021-11-05 PROCEDURE — 96368 THER/DIAG CONCURRENT INF: CPT | Performed by: STUDENT IN AN ORGANIZED HEALTH CARE EDUCATION/TRAINING PROGRAM

## 2021-11-05 PROCEDURE — 250N000009 HC RX 250: Performed by: STUDENT IN AN ORGANIZED HEALTH CARE EDUCATION/TRAINING PROGRAM

## 2021-11-05 PROCEDURE — 83605 ASSAY OF LACTIC ACID: CPT | Performed by: STUDENT IN AN ORGANIZED HEALTH CARE EDUCATION/TRAINING PROGRAM

## 2021-11-05 PROCEDURE — 99285 EMERGENCY DEPT VISIT HI MDM: CPT | Performed by: STUDENT IN AN ORGANIZED HEALTH CARE EDUCATION/TRAINING PROGRAM

## 2021-11-05 PROCEDURE — 96376 TX/PRO/DX INJ SAME DRUG ADON: CPT | Performed by: STUDENT IN AN ORGANIZED HEALTH CARE EDUCATION/TRAINING PROGRAM

## 2021-11-05 PROCEDURE — 96367 TX/PROPH/DG ADDL SEQ IV INF: CPT | Performed by: STUDENT IN AN ORGANIZED HEALTH CARE EDUCATION/TRAINING PROGRAM

## 2021-11-05 PROCEDURE — 250N000013 HC RX MED GY IP 250 OP 250 PS 637: Performed by: STUDENT IN AN ORGANIZED HEALTH CARE EDUCATION/TRAINING PROGRAM

## 2021-11-05 PROCEDURE — 99285 EMERGENCY DEPT VISIT HI MDM: CPT | Mod: 25 | Performed by: STUDENT IN AN ORGANIZED HEALTH CARE EDUCATION/TRAINING PROGRAM

## 2021-11-05 PROCEDURE — 96375 TX/PRO/DX INJ NEW DRUG ADDON: CPT | Performed by: STUDENT IN AN ORGANIZED HEALTH CARE EDUCATION/TRAINING PROGRAM

## 2021-11-05 PROCEDURE — 36415 COLL VENOUS BLD VENIPUNCTURE: CPT | Performed by: STUDENT IN AN ORGANIZED HEALTH CARE EDUCATION/TRAINING PROGRAM

## 2021-11-05 RX ORDER — LIDOCAINE HYDROCHLORIDE 20 MG/ML
15 SOLUTION OROPHARYNGEAL ONCE
Status: COMPLETED | OUTPATIENT
Start: 2021-11-05 | End: 2021-11-05

## 2021-11-05 RX ORDER — MEROPENEM 1 G/1
1 INJECTION, POWDER, FOR SOLUTION INTRAVENOUS EVERY 8 HOURS
Status: DISCONTINUED | OUTPATIENT
Start: 2021-11-05 | End: 2021-11-05 | Stop reason: ALTCHOICE

## 2021-11-05 RX ORDER — ONDANSETRON 2 MG/ML
4 INJECTION INTRAMUSCULAR; INTRAVENOUS ONCE
Status: COMPLETED | OUTPATIENT
Start: 2021-11-05 | End: 2021-11-05

## 2021-11-05 RX ORDER — MAGNESIUM HYDROXIDE/ALUMINUM HYDROXICE/SIMETHICONE 120; 1200; 1200 MG/30ML; MG/30ML; MG/30ML
15 SUSPENSION ORAL ONCE
Status: COMPLETED | OUTPATIENT
Start: 2021-11-05 | End: 2021-11-05

## 2021-11-05 RX ORDER — SODIUM CHLORIDE 9 MG/ML
INJECTION, SOLUTION INTRAVENOUS CONTINUOUS
Status: DISCONTINUED | OUTPATIENT
Start: 2021-11-05 | End: 2021-11-06 | Stop reason: HOSPADM

## 2021-11-05 RX ORDER — CEFTRIAXONE SODIUM 1 G/50ML
1 INJECTION, SOLUTION INTRAVENOUS ONCE
Status: COMPLETED | OUTPATIENT
Start: 2021-11-05 | End: 2021-11-05

## 2021-11-05 RX ORDER — ACETAMINOPHEN 325 MG/1
650 TABLET ORAL ONCE
Status: COMPLETED | OUTPATIENT
Start: 2021-11-05 | End: 2021-11-05

## 2021-11-05 RX ADMIN — LIDOCAINE HYDROCHLORIDE 15 ML: 20 SOLUTION ORAL; TOPICAL at 10:14

## 2021-11-05 RX ADMIN — HYDROMORPHONE HYDROCHLORIDE 1 MG: 1 INJECTION, SOLUTION INTRAMUSCULAR; INTRAVENOUS; SUBCUTANEOUS at 10:20

## 2021-11-05 RX ADMIN — ONDANSETRON 4 MG: 2 INJECTION INTRAMUSCULAR; INTRAVENOUS at 10:19

## 2021-11-05 RX ADMIN — METRONIDAZOLE 500 MG: 500 INJECTION, SOLUTION INTRAVENOUS at 17:05

## 2021-11-05 RX ADMIN — SODIUM CHLORIDE 1000 ML: 9 INJECTION, SOLUTION INTRAVENOUS at 17:04

## 2021-11-05 RX ADMIN — ACETAMINOPHEN 650 MG: 325 TABLET, FILM COATED ORAL at 16:31

## 2021-11-05 RX ADMIN — HYDROMORPHONE HYDROCHLORIDE 1 MG: 1 INJECTION, SOLUTION INTRAMUSCULAR; INTRAVENOUS; SUBCUTANEOUS at 16:25

## 2021-11-05 RX ADMIN — MAGNESIUM HYDROXIDE/ALUMINUM HYDROXICE/SIMETHICONE 15 ML: 120; 1200; 1200 SUSPENSION ORAL at 10:14

## 2021-11-05 RX ADMIN — TAZOBACTAM SODIUM AND PIPERACILLIN SODIUM 4.5 G: 500; 4 INJECTION, SOLUTION INTRAVENOUS at 11:11

## 2021-11-05 RX ADMIN — HYDROMORPHONE HYDROCHLORIDE 1 MG: 1 INJECTION, SOLUTION INTRAMUSCULAR; INTRAVENOUS; SUBCUTANEOUS at 22:05

## 2021-11-05 RX ADMIN — CEFTRIAXONE SODIUM 1 G: 1 INJECTION, SOLUTION INTRAVENOUS at 17:04

## 2021-11-05 NOTE — ED TRIAGE NOTES
ED Nursing Triage Note (General)   ________________________________    Faisal Muñoz is a 71 year old Male that presents to triage via private vehicle with complaints of abdominal pain.  Patient states he was seen yesterday for similar symptoms and treated with relief.  Patient states at 0600 mid upper epigastric pain returned. Patient denies N/V/diarrhea at this time.  Patient denies known hx of ulcers.   Significant symptoms had onset 24 hours ago.  Patient appears alert behavior.  GCS-15  Airway: intact  Breathing noted as Normal  Action taken: 3      PRE HOSPITAL PRIOR LIVING SITUATION-home

## 2021-11-05 NOTE — ED PROVIDER NOTES
History     Chief Complaint   Patient presents with     Abdominal Pain       Faisal Muñoz is a 71 year old male who presents with epigastric pain.  Patient seen yesterday with concern for cholangitis with dilated common bile duct and leukocytosis and elevated liver enzymes and bilirubin, with [place] decision home manager trial.  This morning severe pain recurred consistent with the pain he had yesterday.  Pain rated 10/10.  He was able to take his morning p.o. antibiotics that were prescribed to him yesterday. Denies fever, chills, dyspnea, chest pain, other pain, nausea, vomiting, dysuria, diarrhea, constipation.  Denies regular alcohol, NSAIDs, steroid use or history of ulcers.     No Known Allergies    Patient Active Problem List    Diagnosis Date Noted     Angina of effort (H) 04/02/2021     Priority: Medium     Formatting of this note might be different from the original.  Added automatically from request for surgery [id number]       Former smoker 06/19/2019     Priority: Medium     Formatting of this note might be different from the original.  Smoked < 1ppd for 50 years. Pack used to last a week until fall 2016 when started to smoke heavily x 1 month. Then quit cold turkey. Hasn't smoked since.       Adhesive capsulitis of left shoulder 10/31/2017     Priority: Medium     S/P drug eluting coronary stent placement 08/11/2017     Priority: Medium     Formatting of this note might be different from the original.   PCI of distal RCA into PDA with a 2.75 x 18 mm Resolute CRISTOFER; mid RCA stented wtih a 3.0 x 26 mm Resolute CRISTOFER; large PLVB dilated with a 3.0 x 15 mm balloon with a nice result.  He had normal LVEDP. Plavix and ASA x 1 year. ASA indefinitely.       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  IMO Update  Updated per 10/1/17 IMO import  Replacing diagnoses that were inactivated after the 10/1/2021 regulatory import.       YOUNG (nonalcoholic steatohepatitis) 2012     Priority: Medium     Formatting of this note might be different from the original.  IMO Update 10/11       Essential hypertension 2006     Priority: Medium     Formatting of this note might be different from the original.  DRHC/ER -         Past Medical History:   Diagnosis Date     Adhesive capsulitis of left shoulder        No past surgical history on file.    No family history on file.    Social History     Tobacco Use     Smoking status: Former Smoker     Quit date: 10/2/2016     Years since quittin.0     Smokeless tobacco: Never Used   Substance Use Topics     Alcohol use: No     Alcohol/week: 0.0 standard drinks     Drug use: Unknown     Types: Other     Comment: Drug use: No       Medications:    amLODIPine-benazepril (LOTREL) 10-20 MG per capsule  aspirin EC 81 MG EC tablet  atorvastatin (LIPITOR) 80 MG tablet  blood glucose monitoring (NO BRAND SPECIFIED) test strip  Blood Glucose Monitoring Suppl (FIFTY50 GLUCOSE METER 2.0) W/DEVICE KIT  ciprofloxacin (CIPRO) 500 MG tablet  clopidogrel (PLAVIX) 75 MG tablet  gabapentin (NEURONTIN) 300 MG capsule  HYDROcodone-acetaminophen (NORCO) 5-325 MG per tablet  insulin glargine (LANTUS SOLOSTAR) 100 UNIT/ML injection  metFORMIN (GLUCOPHAGE) 500 MG tablet  metoprolol succinate (TOPROL-XL) 50 MG 24 hr tablet  metroNIDAZOLE (FLAGYL) 500 MG tablet  nitroGLYcerin (NITROSTAT) 0.4 MG sublingual tablet        Review of Systems: See HPI for pertinent negatives and positives. All other systems reviewed and found to be negative.    Physical Exam   /73   Pulse 100   Temp (!) 101.9  F (38.8  C)   Resp 20   Wt 111.1 kg (245 lb)    SpO2 94%   BMI 30.62 kg/m       General: awake, uncomfortable in distress  HEENT: atraumatic  Respiratory: normal effort, clear to auscultation bilaterally  Cardiovascular: regular rate and rhythm, no murmurs  Abdomen: soft, nondistended, RUQ/epigastric tenderness  Extremities: no deformities, edema, or tenderness  Skin: warm, dry, no rashes  Neuro: alert, no focal deficits  Psych: appropriate mood and affect    ED Course      ED Course as of Nov 05 2037 Fri Nov 05, 2021   1120 Comfortable now. Needs transfer for ERCP/MRCP.          Results for orders placed or performed during the hospital encounter of 11/05/21 (from the past 24 hour(s))   CBC with platelets differential    Narrative    The following orders were created for panel order CBC with platelets differential.  Procedure                               Abnormality         Status                     ---------                               -----------         ------                     CBC with platelets and d...[[id number]]  Abnormal            Final result                 Please view results for these tests on the individual orders.   Comprehensive metabolic panel   Result Value Ref Range    Sodium 133 (L) 134 - 144 mmol/L    Potassium 3.9 3.5 - 5.1 mmol/L    Chloride 98 98 - 107 mmol/L    Carbon Dioxide (CO2) 22 21 - 31 mmol/L    Anion Gap 13 3 - 14 mmol/L    Urea Nitrogen 26 (H) 7 - 25 mg/dL    Creatinine 1.37 (H) 0.70 - 1.30 mg/dL    Calcium 9.6 8.6 - 10.3 mg/dL    Glucose 177 (H) 70 - 105 mg/dL    Alkaline Phosphatase 152 (H) 34 - 104 U/L     (H) 13 - 39 U/L     (H) 7 - 52 U/L    Protein Total 7.6 6.4 - 8.9 g/dL    Albumin 4.2 3.5 - 5.7 g/dL    Bilirubin Total 5.9 (H) 0.3 - 1.0 mg/dL    GFR Estimate 51 (L) >60 mL/min/1.73m2   Lactic acid whole blood   Result Value Ref Range    Lactic Acid 1.3 0.7 - 2.0 mmol/L   CBC with platelets and differential   Result Value Ref Range    WBC Count 14.1 (H) 4.0 - 11.0 10e3/uL    RBC Count 4.49 4.40 -  5.90 10e6/uL    Hemoglobin 13.3 13.3 - 17.7 g/dL    Hematocrit 38.5 (L) 40.0 - 53.0 %    MCV 86 78 - 100 fL    MCH 29.6 26.5 - 33.0 pg    MCHC 34.5 31.5 - 36.5 g/dL    RDW 13.6 10.0 - 15.0 %    Platelet Count 184 150 - 450 10e3/uL    % Neutrophils 89 %    % Lymphocytes 3 %    % Monocytes 8 %    % Eosinophils 0 %    % Basophils 0 %    % Immature Granulocytes 0 %    NRBCs per 100 WBC 0 <1 /100    Absolute Neutrophils 12.6 (H) 1.6 - 8.3 10e3/uL    Absolute Lymphocytes 0.5 (L) 0.8 - 5.3 10e3/uL    Absolute Monocytes 1.1 0.0 - 1.3 10e3/uL    Absolute Eosinophils 0.0 0.0 - 0.7 10e3/uL    Absolute Basophils 0.0 0.0 - 0.2 10e3/uL    Absolute Immature Granulocytes 0.1 (H) <=0.0 10e3/uL    Absolute NRBCs 0.0 10e3/uL       Medications   0.9% sodium chloride BOLUS ( Intravenous Canceled Entry 11/5/21 1821)     Followed by   sodium chloride 0.9% infusion (has no administration in time range)   alum & mag hydroxide-simethicone (MAALOX) suspension 15 mL (15 mLs Oral Given 11/5/21 1014)     And   lidocaine (XYLOCAINE) 2 % solution 15 mL (15 mLs Mouth/Throat Given 11/5/21 1014)   ondansetron (ZOFRAN) injection 4 mg (4 mg Intravenous Given 11/5/21 1019)   HYDROmorphone (DILAUDID) injection 1 mg (1 mg Intravenous Given 11/5/21 1020)   HYDROmorphone (DILAUDID) injection 1 mg (1 mg Intravenous Given 11/5/21 1625)   acetaminophen (TYLENOL) tablet 650 mg (650 mg Oral Given 11/5/21 1631)   cefTRIAXone in d5w (ROCEPHIN) intermittent infusion 1 g (0 g Intravenous Stopped 11/5/21 1815)   metroNIDAZOLE (FLAGYL) infusion 500 mg (500 mg Intravenous New Bag 11/5/21 1705)       Assessments & Plan (with Medical Decision Making)     I have reviewed the nursing notes.    71 year old male evaluated for recurring epigastric/RUQ pain seen yesterday for similar symptoms with findings concerning for cholangitis. Failed outpatient management with PO antibiotics and was not transferred yesterday due to OSH not having Covid isolation bed available. Worsening  transaminitis, bilirubin, leukocytosis. Treated with Zosyn and dilaudid and fluids. Initially afebrile but then spiked fever here meeting sepsis with fever and leukocytosis right before transfer. Case d/w and transfer accepted by [facility] Dr. Bravo with need for GI consultation for likely ERCP/MRCP. Patient stable at time of transfer of care.    I have reviewed the findings, diagnosis, and plan with the patient.    New Prescriptions    No medications on file       Final diagnoses:   Cholangitis       11/5/2021   [facility]     Roel Hernandez MD  11/05/21 2037

## 2021-11-06 ENCOUNTER — TRANSFERRED RECORDS (OUTPATIENT)
Dept: HEALTH INFORMATION MANAGEMENT | Facility: OTHER | Age: 71
End: 2021-11-06
Payer: COMMERCIAL

## 2021-11-06 VITALS
RESPIRATION RATE: 20 BRPM | BODY MASS INDEX: 30.62 KG/M2 | HEART RATE: 93 BPM | OXYGEN SATURATION: 93 % | WEIGHT: 245 LBS | SYSTOLIC BLOOD PRESSURE: 108 MMHG | DIASTOLIC BLOOD PRESSURE: 51 MMHG | TEMPERATURE: 99.3 F

## 2021-11-06 NOTE — PROGRESS NOTES
Received phone call from [employer] with [facility]. Patient has been accepted at [facility], room 3264-1, [address]. Report to be called to [phone] and fax info to [phone].

## 2021-11-06 NOTE — ED NOTES
Meds [facility] contacted.  They will find a crew but it may be a little of a wait medel to other transfers.  Jo Ann Coreas RN on 11/5/2021 at 10:44 PM

## 2021-11-07 LAB
ALT SERPL-CCNC: 103 LU/L (ref 6–40)
AST SERPL-CCNC: 73 LU/L (ref 10–40)
CREATININE (EXTERNAL): 0.79 MG/DL (ref 0.7–1.2)
GFR ESTIMATED (EXTERNAL): >60 ML/MIN/1.73M*2
GLUCOSE (EXTERNAL): 84 MG/DL (ref 70–99)
POTASSIUM (EXTERNAL): 3.6 MEQ/L (ref 3.4–5.1)

## 2021-11-08 LAB
ALT SERPL-CCNC: 90 LU/L (ref 6–40)
AST SERPL-CCNC: 71 LU/L (ref 10–40)
CREATININE (EXTERNAL): 0.77 MG/DL (ref 0.7–1.2)
GFR ESTIMATED (EXTERNAL): >60 ML/MIN/1.73M*2
GLUCOSE (EXTERNAL): 152 MG/DL (ref 70–99)
POTASSIUM (EXTERNAL): 3.5 MEQ/L (ref 3.4–5.1)

## 2021-11-09 LAB
ALT SERPL-CCNC: 76 LU/L (ref 6–40)
AST SERPL-CCNC: 57 LU/L (ref 10–40)
CREATININE (EXTERNAL): 0.83 MG/DL (ref 0.7–1.2)
GFR ESTIMATED (EXTERNAL): >60 ML/MIN/1.73M*2
GLUCOSE (EXTERNAL): 307 MG/DL (ref 70–99)
POTASSIUM (EXTERNAL): 4.1 MEQ/L (ref 3.4–5.1)

## 2021-11-10 LAB
BACTERIA BLD CULT: NO GROWTH
BACTERIA BLD CULT: NO GROWTH

## 2022-04-15 ENCOUNTER — HOSPITAL ENCOUNTER (OUTPATIENT)
Dept: MRI IMAGING | Facility: OTHER | Age: 72
Discharge: HOME | End: 2022-04-15
Attending: FAMILY MEDICINE | Admitting: FAMILY MEDICINE
Payer: COMMERCIAL

## 2022-04-15 DIAGNOSIS — M75.101 TEAR OF RIGHT ROTATOR CUFF: ICD-10-CM

## 2022-04-15 PROCEDURE — 73221 MRI JOINT UPR EXTREM W/O DYE: CPT | Mod: RT

## 2022-08-25 NOTE — ED NOTES
Provider notified pain relieved with PIV medication     Slit Excision Additional Text (Leave Blank If You Do Not Want): A linear line was drawn on the skin overlying the lesion. An incision was made slowly until the lesion was visualized.  Once visualized, the lesion was removed with blunt dissection.

## 2023-01-27 LAB — RETINOPATHY: NEGATIVE

## 2023-05-02 ENCOUNTER — HOSPITAL ENCOUNTER (EMERGENCY)
Facility: OTHER | Age: 73
Discharge: HOME | End: 2023-05-02
Attending: EMERGENCY MEDICINE | Admitting: EMERGENCY MEDICINE
Payer: COMMERCIAL

## 2023-05-02 VITALS
TEMPERATURE: 97.4 F | BODY MASS INDEX: 30.46 KG/M2 | RESPIRATION RATE: 20 BRPM | HEIGHT: 75 IN | OXYGEN SATURATION: 97 % | HEART RATE: 70 BPM | DIASTOLIC BLOOD PRESSURE: 95 MMHG | WEIGHT: 245 LBS | SYSTOLIC BLOOD PRESSURE: 165 MMHG

## 2023-05-02 DIAGNOSIS — M16.12 OSTEOARTHRITIS OF LEFT HIP, UNSPECIFIED OSTEOARTHRITIS TYPE: ICD-10-CM

## 2023-05-02 PROBLEM — M25.60 RANGE OF MOTION DEFICIT: Status: ACTIVE | Noted: 2022-07-06

## 2023-05-02 PROBLEM — Z79.4 ENCOUNTER FOR LONG-TERM (CURRENT) USE OF INSULIN (H): Status: ACTIVE | Noted: 2021-07-27

## 2023-05-02 PROBLEM — M62.81 MUSCLE WEAKNESS OF RIGHT ARM: Status: ACTIVE | Noted: 2022-07-06

## 2023-05-02 PROBLEM — I25.118 CORONARY ARTERY DISEASE OF NATIVE ARTERY OF NATIVE HEART WITH STABLE ANGINA PECTORIS (H): Status: ACTIVE | Noted: 2021-12-20

## 2023-05-02 PROBLEM — M75.121 NONTRAUMATIC COMPLETE TEAR OF RIGHT ROTATOR CUFF: Status: ACTIVE | Noted: 2022-05-13

## 2023-05-02 PROBLEM — Z79.891 LONG TERM (CURRENT) USE OF OPIATE ANALGESIC: Status: ACTIVE | Noted: 2022-09-28

## 2023-05-02 PROBLEM — D64.9 ANEMIA: Status: ACTIVE | Noted: 2021-11-10

## 2023-05-02 PROCEDURE — 99283 EMERGENCY DEPT VISIT LOW MDM: CPT | Performed by: EMERGENCY MEDICINE

## 2023-05-02 RX ORDER — TRAMADOL HYDROCHLORIDE 50 MG/1
50 TABLET ORAL EVERY 6 HOURS PRN
Qty: 20 TABLET | Refills: 0 | Status: SHIPPED | OUTPATIENT
Start: 2023-05-02

## 2023-05-02 ASSESSMENT — ENCOUNTER SYMPTOMS
AGITATION: 0
CHILLS: 0
DYSURIA: 0
CHEST TIGHTNESS: 0
ARTHRALGIAS: 1
NAUSEA: 0
LIGHT-HEADEDNESS: 0
FEVER: 0
SHORTNESS OF BREATH: 0
VOMITING: 0

## 2023-05-02 ASSESSMENT — ACTIVITIES OF DAILY LIVING (ADL): ADLS_ACUITY_SCORE: 33

## 2023-05-02 NOTE — ED PROVIDER NOTES
History     Chief Complaint   Patient presents with     Hip Pain     HPI  Faisal Muñoz is a 73 year old male who comes in with flare of pain in his left hip.  He has a history of osteoarthritis there.  Last 5 days he has had a slowly increasing onset of pain there.  He says he cannot sleep because of the pain.  Is been taking some Zanaflex which only helps temporarily.  No injury that he can recall.  Again it came on somewhat slowly.  Has not yet followed up with his primary provider.  He has also seen orthopedics for this in the past.  He has in the past been on pain control, however in reviewing the [facility] website, he has not gotten any prescriptions for anything for pain since last September.    Allergies:  No Known Allergies    Problem List:    Patient Active Problem List    Diagnosis Date Noted     Long term (current) use of opiate analgesic 09/28/2022     Priority: Medium     Muscle weakness of right arm 07/06/2022     Priority: Medium     Range of motion deficit 07/06/2022     Priority: Medium     Nontraumatic complete tear of right rotator cuff 05/13/2022     Priority: Medium     Coronary artery disease of native artery of native heart with stable angina pectoris (H) 12/20/2021     Priority: Medium     Anemia 11/10/2021     Priority: Medium     Encounter for long-term (current) use of insulin (H) 07/27/2021     Priority: Medium     Angina of effort (H) 04/02/2021     Priority: Medium     Formatting of this note might be different from the original.  Added automatically from request for surgery [id number]       Former smoker 06/19/2019     Priority: Medium     Formatting of this note might be different from the original.  Smoked < 1ppd for 50 years. Pack used to last a week until fall 2016 when started to smoke heavily x 1 month. Then quit cold turkey. Hasn't smoked since.       Adhesive capsulitis of left shoulder 10/31/2017     Priority: Medium     S/P drug eluting coronary stent placement  2017     Priority: Medium     Formatting of this note might be different from the original.   PCI of distal RCA into PDA with a 2.75 x 18 mm Resolute CRISTOFER; mid RCA stented wtih a 3.0 x 26 mm Resolute CRISTOFER; large PLVB dilated with a 3.0 x 15 mm balloon with a nice result.  He had normal LVEDP. Plavix and ASA x 1 year. ASA indefinitely.       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  IMO Update  Updated per 10/1/17 IMO import  Replacing diagnoses that were inactivated after the 10/1/2021 regulatory import.       YOUNG (nonalcoholic steatohepatitis) 2012     Priority: Medium     Formatting of this note might be different from the original.  IMO Update 10/11       Essential hypertension 2006     Priority: Medium     Formatting of this note might be different from the original.  DRHC/ER -          Past Medical History:    Past Medical History:   Diagnosis Date     Adhesive capsulitis of left shoulder        Past Surgical History:    No past surgical history on file.    Family History:    No family history on file.    Social History:  Marital Status:   [2]  Social History     Tobacco Use     Smoking status: Former     Types: Cigarettes     Quit date: 10/2/2016     Years since quittin.5     Smokeless tobacco: Never   Substance Use Topics     Alcohol use: No     Alcohol/week: 0.0 standard drinks of alcohol     Drug use: Unknown     Types: Other     Comment: Drug use: No        Medications:    traMADol (ULTRAM) 50 MG tablet  amLODIPine-benazepril (LOTREL) 10-20 MG  "per capsule  aspirin EC 81 MG EC tablet  atorvastatin (LIPITOR) 80 MG tablet  blood glucose monitoring (NO BRAND SPECIFIED) test strip  Blood Glucose Monitoring Suppl (FIFTY50 GLUCOSE METER 2.0) W/DEVICE KIT  clopidogrel (PLAVIX) 75 MG tablet  gabapentin (NEURONTIN) 300 MG capsule  HYDROcodone-acetaminophen (NORCO) 5-325 MG per tablet  insulin glargine (LANTUS SOLOSTAR) 100 UNIT/ML injection  metFORMIN (GLUCOPHAGE) 500 MG tablet  metoprolol succinate (TOPROL-XL) 50 MG 24 hr tablet  nitroGLYcerin (NITROSTAT) 0.4 MG sublingual tablet          Review of Systems   Constitutional: Negative for chills and fever.   HENT: Negative for congestion.    Eyes: Negative for visual disturbance.   Respiratory: Negative for chest tightness and shortness of breath.    Cardiovascular: Negative for chest pain.   Gastrointestinal: Negative for nausea and vomiting.   Genitourinary: Negative for dysuria.   Musculoskeletal: Positive for arthralgias.   Skin: Negative for rash.   Neurological: Negative for light-headedness.   Psychiatric/Behavioral: Negative for agitation.       Physical Exam   BP: (!) 163/81  Pulse: 71  Temp: 97.4  F (36.3  C)  Resp: 20  Height: 190.5 cm (6' 3\")  Weight: 111.1 kg (245 lb)  SpO2: 96 %      Physical Exam  Vitals and nursing note reviewed.   Constitutional:       Appearance: Normal appearance.   HENT:      Head: Normocephalic and atraumatic.      Mouth/Throat:      Mouth: Mucous membranes are moist.   Cardiovascular:      Rate and Rhythm: Normal rate.   Pulmonary:      Effort: Pulmonary effort is normal.   Abdominal:      Palpations: Abdomen is soft.   Musculoskeletal:      Comments: He is able to stand and ambulate on his own, however he is limping quite a bit.  He cannot really hold any weight on the left hip.  Tender in the left anterior groin area   Skin:     General: Skin is dry.   Neurological:      Mental Status: He is alert and oriented to person, place, and time.   Psychiatric:         Behavior: " Behavior normal.         ED Course                 Procedures                  No results found for this or any previous visit (from the past 24 hour(s)).    Medications - No data to display    Assessments & Plan (with Medical Decision Making)     I have reviewed the nursing notes.    I have reviewed the findings, diagnosis, plan and need for follow up with the patient.  There is no trauma, he is ambulating, I do not believe x-rays will be helpful.  This sounds like an exacerbation of his chronic osteoarthritis.  We will give him a short course of some tramadol and asked him to follow-up with his primary provider or orthopedics as soon as possible        New Prescriptions    TRAMADOL (ULTRAM) 50 MG TABLET    Take 1 tablet (50 mg) by mouth every 6 hours as needed for severe pain       Final diagnoses:   Osteoarthritis of left hip, unspecified osteoarthritis type       5/2/2023   [facility] HospitalArthur boateng MD  05/02/23 2253

## 2023-05-02 NOTE — ED TRIAGE NOTES
"Pt presents to ED with a flare up of his arthritis in his left hip. Pt reports having h/o injections in his hip but has been doing well until 5 days ago. Pt taking \"icy hot\" and zanaflex without relief.    Regine Hogue RN on 5/2/2023 at 10:51 AM       Triage Assessment     Row Name 05/02/23 1050       Peripheral/Neurovascular WDL    Peripheral Neurovascular WDL WDL       Cognitive/Neuro/Behavioral WDL    Cognitive/Neuro/Behavioral WDL WDL              "

## 2023-05-02 NOTE — DISCHARGE INSTRUCTIONS
I would call Dr. Ramachandran today to try to get an appointment with him for next Monday.  You could also try to get in with your primary provider.

## 2023-05-30 LAB
ALT SERPL-CCNC: 27 IU/L (ref 6–40)
AST SERPL-CCNC: 17 IU/L (ref 10–40)
CHOLESTEROL (EXTERNAL): 105 MG/DL (ref 0–200)
CREATININE (EXTERNAL): 0.82 MG/DL (ref 0.7–1.2)
GFR ESTIMATED (EXTERNAL): 92 ML/MIN/1.73M2
GLUCOSE (EXTERNAL): 149 MG/DL (ref 70–99)
HDLC SERPL-MCNC: 37 MG/DL
LDL CHOLESTEROL CALCULATED (EXTERNAL): 43 MG/DL (ref 0–100)
NON HDL CHOLESTEROL (EXTERNAL): 68 MG/DL (ref 0–130)
POTASSIUM (EXTERNAL): 4.2 MEQ/L (ref 3.4–5.1)
TRIGLYCERIDES (EXTERNAL): 127 MG/DL

## 2023-07-12 ENCOUNTER — APPOINTMENT (OUTPATIENT)
Dept: GENERAL RADIOLOGY | Facility: OTHER | Age: 73
End: 2023-07-12
Attending: INTERNAL MEDICINE
Payer: COMMERCIAL

## 2023-07-12 ENCOUNTER — HOSPITAL ENCOUNTER (EMERGENCY)
Facility: OTHER | Age: 73
Discharge: HOME | End: 2023-07-12
Attending: INTERNAL MEDICINE | Admitting: INTERNAL MEDICINE
Payer: COMMERCIAL

## 2023-07-12 VITALS
DIASTOLIC BLOOD PRESSURE: 70 MMHG | WEIGHT: 240 LBS | HEIGHT: 75 IN | RESPIRATION RATE: 18 BRPM | TEMPERATURE: 98.7 F | BODY MASS INDEX: 29.84 KG/M2 | HEART RATE: 104 BPM | OXYGEN SATURATION: 97 % | SYSTOLIC BLOOD PRESSURE: 125 MMHG

## 2023-07-12 DIAGNOSIS — M10.9 ACUTE GOUT OF LEFT KNEE, UNSPECIFIED CAUSE: Primary | ICD-10-CM

## 2023-07-12 DIAGNOSIS — M76.32 IT BAND SYNDROME, LEFT: ICD-10-CM

## 2023-07-12 DIAGNOSIS — M70.50 PES ANSERINE BURSITIS: ICD-10-CM

## 2023-07-12 PROCEDURE — 99284 EMERGENCY DEPT VISIT MOD MDM: CPT | Performed by: INTERNAL MEDICINE

## 2023-07-12 PROCEDURE — 99283 EMERGENCY DEPT VISIT LOW MDM: CPT | Performed by: INTERNAL MEDICINE

## 2023-07-12 PROCEDURE — 73560 X-RAY EXAM OF KNEE 1 OR 2: CPT | Mod: LT

## 2023-07-12 RX ORDER — PREDNISONE 10 MG/1
10 TABLET ORAL DAILY
Qty: 30 TABLET | Refills: 0 | Status: SHIPPED | OUTPATIENT
Start: 2023-07-12

## 2023-07-12 ASSESSMENT — ACTIVITIES OF DAILY LIVING (ADL): ADLS_ACUITY_SCORE: 37

## 2023-07-12 NOTE — DISCHARGE INSTRUCTIONS
Pes anserine bursitis  Acute gout of left knee, unspecified cause  It band syndrome, left    START:   - predniSONE (DELTASONE) 10 MG tablet; Take 1 tablet (10 mg) by mouth daily - for 5 to 7 days, for bout pain (will raise blood sugar levels)      See home care instructions.     Return as needed for follow-up for new / worsening symptoms.

## 2023-07-12 NOTE — ED TRIAGE NOTES
"Patient here with left knee pain that he has had for a week. Pain is worse today and patient has used topical pain relievers he reports./70   Pulse 104   Temp 98.7  F (37.1  C) (Temporal)   Resp 18   Ht 1.905 m (6' 3\")   Wt 108.9 kg (240 lb)   SpO2 97%   BMI 30.00 kg/m         Triage Assessment     Row Name 07/12/23 5273       Triage Assessment (Adult)    Airway WDL WDL       Respiratory WDL    Respiratory WDL WDL       Skin Circulation/Temperature WDL    Skin Circulation/Temperature WDL WDL       Cardiac WDL    Cardiac WDL WDL       Peripheral/Neurovascular WDL    Peripheral Neurovascular WDL WDL       Cognitive/Neuro/Behavioral WDL    Cognitive/Neuro/Behavioral WDL WDL              "

## 2023-07-13 NOTE — ED PROVIDER NOTES
Emergency Department Provider Note  : 1950 Age: 73 year old Sex: male MRN: [id number]    Chief Complaint   Patient presents with     Knee Pain       Medical Decision Making / Assessment / Plan   73 year old male presenting with Left knee / leg pain, bursitis.    ED Course as of 23 Left knee xray ordered.    183 No significant degenerative changes noted on portable x-ray.  No fractures noted.  Final read pending.   185 Patient reports history of gout in both feet in the past.  Suspect acute gout attack along with bursitis and IT Band syndrome.  He is diabetic and uses oral and insulin medications.    Take 10 mg prednisone daily for 5 to 7 days for gout/knee pain.        A shared decision making model was used. Plan and all results were discussed  Time was taken to answer all questions. Patient and/or associated parties understood and were agreeable to treatment plan.  Strict return to Emergency Department precautions as well as appropriate follow up instructions were provided. The patient was discharged in stable condition.    New Prescriptions    PREDNISONE (DELTASONE) 10 MG TABLET    Take 1 tablet (10 mg) by mouth daily - for 5 to 7 days, for bout pain (will raise blood sugar levels)       Final diagnoses:   Pes anserine bursitis   Acute gout of left knee, unspecified cause   It band syndrome, left       Germain Grant MD  2023   Emergency Department    Daria Malone is a 73 year old male who presents at  5:50 PM with left knee pain, leg pain for about the past 7 days.  Started out with some aching and has been gradually worsening.  Now with effusion.  Had to leave work early today because of too much pain.  He tried to see sports medicine but was unable to get in for an appointment until the .    Does have a history of gout in the past.  He also has type 2 diabetes with both oral and insulin therapy.    Having left knee swelling some warmth and  "redness.  No signs of infection.  No fevers or chills.    I have reviewed the Medications, Allergies, Past Medical and Surgical History, and Social History in the Epic System and with family.    Review of Systems:  Please see Subjective / HPI for pertinent positives and negatives. All other systems reviewed and found to be negative.      Objective     Patient Vitals for the past 24 hrs:   BP Temp Temp src Pulse Resp SpO2 Height Weight   07/12/23 1721 125/70 98.7  F (37.1  C) Temporal 104 18 97 % 1.905 m (6' 3\") 108.9 kg (240 lb)       Physical Exam:     General: Awake, alert, in no acute respiratory distress.  Chest/Respiratory: Equal chest rise, clear bilaterally.  Cardiovascular: Peripheral pulses present, regular rhythm .  Extremities: Left knee with moderate effusion, tenderness to palpation, mild redness.  Anserine bursitis as well as IT band syndrome to palpation.  Neurological: GCS 15, extremities otherwise without gross deficit.  Skin: Warm, no rashes, lesions, or bruising.   Psychiatric: Appropriate affect.     Procedures / Critical Care   Procedures    Aggregate Critical Care Time: None.     Orders Placed This Encounter   Procedures     XR Knee Port Left 1/2 Views       RESULTS: As noted above.          Medical/Surgical History:  Past Medical History:   Diagnosis Date     Adhesive capsulitis of left shoulder     10/31/2017     No past surgical history on file.    Medications:  No current facility-administered medications for this encounter.     Current Outpatient Medications   Medication     predniSONE (DELTASONE) 10 MG tablet     amLODIPine-benazepril (LOTREL) 10-20 MG per capsule     aspirin EC 81 MG EC tablet     atorvastatin (LIPITOR) 80 MG tablet     blood glucose monitoring (NO BRAND SPECIFIED) test strip     Blood Glucose Monitoring Suppl (FIFTY50 GLUCOSE METER 2.0) W/DEVICE KIT     clopidogrel (PLAVIX) 75 MG tablet     gabapentin (NEURONTIN) 300 MG capsule     HYDROcodone-acetaminophen (NORCO) " 5-325 MG per tablet     insulin glargine (LANTUS SOLOSTAR) 100 UNIT/ML injection     metFORMIN (GLUCOPHAGE) 500 MG tablet     metoprolol succinate (TOPROL-XL) 50 MG 24 hr tablet     nitroGLYcerin (NITROSTAT) 0.4 MG sublingual tablet     traMADol (ULTRAM) 50 MG tablet       Allergies:  Patient has no known allergies.    Relevant labs, images, EKGs, Epic and outside hospital (if applicable) charts were reviewed. The findings, diagnosis, plan, and need for follow up were discussed with the patient/family. Nursing notes were reviewed.      Germain Grant MD  07/12/23 0626

## 2023-09-11 ENCOUNTER — TRANSFERRED RECORDS (OUTPATIENT)
Dept: MULTI SPECIALTY CLINIC | Facility: CLINIC | Age: 73
End: 2023-09-11

## 2023-09-11 LAB
ALBUMIN (URINE) MG/SPEC: 2.1 MG/DL
CREATININE (URINE): 213 MG/DL
HBA1C MFR BLD: 7.7 % (ref 4–5.6)

## 2023-10-05 ENCOUNTER — HOSPITAL ENCOUNTER (EMERGENCY)
Facility: OTHER | Age: 73
Discharge: HOME | End: 2023-10-05
Attending: PHYSICIAN ASSISTANT | Admitting: PHYSICIAN ASSISTANT
Payer: COMMERCIAL

## 2023-10-05 ENCOUNTER — APPOINTMENT (OUTPATIENT)
Dept: CT IMAGING | Facility: OTHER | Age: 73
End: 2023-10-05
Attending: PHYSICIAN ASSISTANT
Payer: COMMERCIAL

## 2023-10-05 ENCOUNTER — APPOINTMENT (OUTPATIENT)
Dept: GENERAL RADIOLOGY | Facility: OTHER | Age: 73
End: 2023-10-05
Attending: PHYSICIAN ASSISTANT
Payer: COMMERCIAL

## 2023-10-05 VITALS
BODY MASS INDEX: 29.22 KG/M2 | HEIGHT: 75 IN | SYSTOLIC BLOOD PRESSURE: 149 MMHG | WEIGHT: 235 LBS | HEART RATE: 78 BPM | RESPIRATION RATE: 16 BRPM | OXYGEN SATURATION: 98 % | TEMPERATURE: 98 F | DIASTOLIC BLOOD PRESSURE: 80 MMHG
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
## 2023-10-05 DIAGNOSIS — S49.92XA SHOULDER INJURY, LEFT, INITIAL ENCOUNTER: ICD-10-CM

## 2023-10-05 DIAGNOSIS — S20.212A CHEST WALL CONTUSION, LEFT, INITIAL ENCOUNTER: ICD-10-CM

## 2023-10-05 DIAGNOSIS — S09.90XA INJURY OF HEAD, INITIAL ENCOUNTER: ICD-10-CM

## 2023-10-05 LAB
ALBUMIN SERPL BCG-MCNC: 4.4 G/DL (ref 3.5–5.2)
ALP SERPL-CCNC: 115 U/L (ref 40–129)
ALT SERPL W P-5'-P-CCNC: 25 U/L (ref 0–70)
ANION GAP SERPL CALCULATED.3IONS-SCNC: 11 MMOL/L (ref 7–15)
AST SERPL W P-5'-P-CCNC: 25 U/L (ref 0–45)
BASE EXCESS BLDV CALC-SCNC: -0.9 MMOL/L (ref -7.7–1.9)
BASO+EOS+MONOS # BLD AUTO: ABNORMAL 10*3/UL
BASO+EOS+MONOS NFR BLD AUTO: ABNORMAL %
BASOPHILS # BLD AUTO: 0 10E3/UL (ref 0–0.2)
BASOPHILS NFR BLD AUTO: 0 %
BILIRUB SERPL-MCNC: 0.4 MG/DL
BUN SERPL-MCNC: 18.7 MG/DL (ref 8–23)
CALCIUM SERPL-MCNC: 9.2 MG/DL (ref 8.8–10.2)
CHLORIDE SERPL-SCNC: 101 MMOL/L (ref 98–107)
CREAT SERPL-MCNC: 0.93 MG/DL (ref 0.67–1.17)
DEPRECATED HCO3 PLAS-SCNC: 24 MMOL/L (ref 22–29)
EGFRCR SERPLBLD CKD-EPI 2021: 86 ML/MIN/1.73M2
EOSINOPHIL # BLD AUTO: 0.1 10E3/UL (ref 0–0.7)
EOSINOPHIL NFR BLD AUTO: 1 %
ERYTHROCYTE [DISTWIDTH] IN BLOOD BY AUTOMATED COUNT: 13.1 % (ref 10–15)
GLUCOSE SERPL-MCNC: 166 MG/DL (ref 70–99)
HCO3 BLDV-SCNC: 25 MMOL/L (ref 21–28)
HCT VFR BLD AUTO: 39.8 % (ref 40–53)
HGB BLD-MCNC: 13.3 G/DL (ref 13.3–17.7)
IMM GRANULOCYTES # BLD: 0.1 10E3/UL
IMM GRANULOCYTES NFR BLD: 1 %
LYMPHOCYTES # BLD AUTO: 1.8 10E3/UL (ref 0.8–5.3)
LYMPHOCYTES NFR BLD AUTO: 11 %
MAGNESIUM SERPL-MCNC: 1.7 MG/DL (ref 1.7–2.3)
MCH RBC QN AUTO: 28.8 PG (ref 26.5–33)
MCHC RBC AUTO-ENTMCNC: 33.4 G/DL (ref 31.5–36.5)
MCV RBC AUTO: 86 FL (ref 78–100)
MONOCYTES # BLD AUTO: 0.8 10E3/UL (ref 0–1.3)
MONOCYTES NFR BLD AUTO: 5 %
NEUTROPHILS # BLD AUTO: 14 10E3/UL (ref 1.6–8.3)
NEUTROPHILS NFR BLD AUTO: 82 %
NRBC # BLD AUTO: 0 10E3/UL
NRBC BLD AUTO-RTO: 0 /100
NT-PROBNP SERPL-MCNC: 101 PG/ML (ref 0–900)
O2/TOTAL GAS SETTING VFR VENT: 21 %
OXYHGB MFR BLDV: 64 % (ref 70–75)
PCO2 BLDV: 45 MM HG (ref 40–50)
PH BLDV: 7.36 [PH] (ref 7.32–7.43)
PLATELET # BLD AUTO: 268 10E3/UL (ref 150–450)
PO2 BLDV: 36 MM HG (ref 25–47)
POTASSIUM SERPL-SCNC: 4.4 MMOL/L (ref 3.4–5.3)
PROT SERPL-MCNC: 7.7 G/DL (ref 6.4–8.3)
RBC # BLD AUTO: 4.62 10E6/UL (ref 4.4–5.9)
SODIUM SERPL-SCNC: 136 MMOL/L (ref 135–145)
T4 FREE SERPL-MCNC: 0.99 NG/DL (ref 0.9–1.7)
TROPONIN T SERPL HS-MCNC: 50 NG/L
TSH SERPL DL<=0.005 MIU/L-ACNC: 4.4 UIU/ML (ref 0.3–4.2)
WBC # BLD AUTO: 16.9 10E3/UL (ref 4–11)

## 2023-10-05 PROCEDURE — 250N000011 HC RX IP 250 OP 636: Performed by: PHYSICIAN ASSISTANT

## 2023-10-05 PROCEDURE — 99285 EMERGENCY DEPT VISIT HI MDM: CPT | Mod: 25 | Performed by: PHYSICIAN ASSISTANT

## 2023-10-05 PROCEDURE — 70450 CT HEAD/BRAIN W/O DYE: CPT

## 2023-10-05 PROCEDURE — 80053 COMPREHEN METABOLIC PANEL: CPT | Performed by: PHYSICIAN ASSISTANT

## 2023-10-05 PROCEDURE — 96372 THER/PROPH/DIAG INJ SC/IM: CPT | Performed by: PHYSICIAN ASSISTANT

## 2023-10-05 PROCEDURE — 93005 ELECTROCARDIOGRAM TRACING: CPT | Performed by: PHYSICIAN ASSISTANT

## 2023-10-05 PROCEDURE — 84439 ASSAY OF FREE THYROXINE: CPT | Performed by: PHYSICIAN ASSISTANT

## 2023-10-05 PROCEDURE — 73030 X-RAY EXAM OF SHOULDER: CPT | Mod: LT

## 2023-10-05 PROCEDURE — 83880 ASSAY OF NATRIURETIC PEPTIDE: CPT | Mod: GZ | Performed by: PHYSICIAN ASSISTANT

## 2023-10-05 PROCEDURE — 84484 ASSAY OF TROPONIN QUANT: CPT | Performed by: PHYSICIAN ASSISTANT

## 2023-10-05 PROCEDURE — 36415 COLL VENOUS BLD VENIPUNCTURE: CPT | Performed by: PHYSICIAN ASSISTANT

## 2023-10-05 PROCEDURE — 99284 EMERGENCY DEPT VISIT MOD MDM: CPT | Performed by: PHYSICIAN ASSISTANT

## 2023-10-05 PROCEDURE — 99285 EMERGENCY DEPT VISIT HI MDM: CPT | Mod: 25 | Performed by: SURGERY

## 2023-10-05 PROCEDURE — 84443 ASSAY THYROID STIM HORMONE: CPT | Mod: GZ | Performed by: PHYSICIAN ASSISTANT

## 2023-10-05 PROCEDURE — 82805 BLOOD GASES W/O2 SATURATION: CPT | Performed by: PHYSICIAN ASSISTANT

## 2023-10-05 PROCEDURE — 93010 ELECTROCARDIOGRAM REPORT: CPT | Performed by: INTERNAL MEDICINE

## 2023-10-05 PROCEDURE — 93005 ELECTROCARDIOGRAM TRACING: CPT | Performed by: SURGERY

## 2023-10-05 PROCEDURE — 72125 CT NECK SPINE W/O DYE: CPT

## 2023-10-05 PROCEDURE — 83735 ASSAY OF MAGNESIUM: CPT | Performed by: PHYSICIAN ASSISTANT

## 2023-10-05 PROCEDURE — 85025 COMPLETE CBC W/AUTO DIFF WBC: CPT | Performed by: PHYSICIAN ASSISTANT

## 2023-10-05 PROCEDURE — 71101 X-RAY EXAM UNILAT RIBS/CHEST: CPT | Mod: LT

## 2023-10-05 RX ORDER — HYDROCODONE BITARTRATE AND ACETAMINOPHEN 5; 325 MG/1; MG/1
1 TABLET ORAL EVERY 6 HOURS PRN
Qty: 20 TABLET | Refills: 0 | Status: SHIPPED | OUTPATIENT
Start: 2023-10-05

## 2023-10-05 RX ORDER — HYDROMORPHONE HYDROCHLORIDE 1 MG/ML
0.5 INJECTION, SOLUTION INTRAMUSCULAR; INTRAVENOUS; SUBCUTANEOUS ONCE
Status: COMPLETED | OUTPATIENT
Start: 2023-10-05 | End: 2023-10-05

## 2023-10-05 RX ADMIN — HYDROMORPHONE HYDROCHLORIDE 0.5 MG: 1 INJECTION, SOLUTION INTRAMUSCULAR; INTRAVENOUS; SUBCUTANEOUS at 16:17

## 2023-10-05 ASSESSMENT — ACTIVITIES OF DAILY LIVING (ADL): ADLS_ACUITY_SCORE: 35

## 2023-10-05 NOTE — ED TRIAGE NOTES
Pt arrives via private vehicle with c/o falling at about 1330. Pt states he fell on left shoulder and left side of head and lost consciousness for unknown amount of time. Denies being on blood thinners. Pupils are equal and reactive.      Triage Assessment       Row Name 10/05/23 1524       Triage Assessment (Adult)    Airway WDL WDL       Respiratory WDL    Respiratory WDL WDL       Skin Circulation/Temperature WDL    Skin Circulation/Temperature WDL WDL       Cardiac WDL    Cardiac WDL WDL       Peripheral/Neurovascular WDL    Peripheral Neurovascular WDL WDL       Cognitive/Neuro/Behavioral WDL    Cognitive/Neuro/Behavioral WDL WDL

## 2023-10-05 NOTE — DISCHARGE INSTRUCTIONS
-Use Norco 1 tablet every 6 hours as needed for pain.  Pain will be worse over the first 2 to 3 days following injury.  After about 48 to 72 hours, pain should start to improve.  Once pain improves, discontinue Norco and use Tylenol in this place.  -Follow-up with orthopedics, call tomorrow to schedule a follow-up appointment  -Use a sling for comfort.  I do recommend working on range of motion of the left shoulder to prevent developing frozen shoulder.  -Return to the ER if you have any worsening symptoms to include worsening headache, chest pain, shortness of breath, abnormal vision, abnormal balance, or any other concerning symptom.

## 2023-10-06 NOTE — ED PROVIDER NOTES
"      EMERGENCY DEPARTMENT ENCOUNTER      NAME: Faisal Muñoz  AGE: 73 year old male  YOB: 1950  MRN: [id number]  EVALUATION DATE & TIME: 10/5/2023  3:25 PM    PCP: Geraldine Grant    ED PROVIDER: Michael Paniagua PA-C       CHIEF COMPLAINT:  Chief Complaint   Patient presents with    Fall    Shoulder Pain       ED COURSE, MEDICAL DECISION MAKING, FINAL IMPRESSION AND PLAN:     The patient was interviewed and examined.  HPI and physical exam as below.  Differential diagnosis and MDM Key Documentation Elements as below.  Vitals and triage note were reviewed.  BP (!) 149/80   Pulse 78   Temp 98  F (36.7  C)   Resp 16   Ht 1.905 m (6' 3\")   Wt 106.6 kg (235 lb)   SpO2 98%   BMI 29.37 kg/m      Faisal Muñoz is a pleasant right-hand-dominant 73 year old male who presents to the ER today for evaluation after mechanical trip and fall.  Patient states that he tripped and fell landing directly on his left shoulder and side of his left head.  Patient states that he thinks he briefly lost consciousness.  Patient states he had a brief headache following the accident but currently denying any headache here.  Patient states that he is mainly having pain in this left shoulder.  Patient states he did have rotator cuff injuries and shoulder repairs in both the shoulders.  Pain is described as moderate worse with movement of his shoulder.  Also having some mild right neck and posterior shoulder pain.  No numbness or paresthesias.  Patient with episodes of vomiting.  Patient not feeling lightheaded or dizzy.  No chest pain, shortness of breath, or dyspnea.  Denies heart palpitations    Differential includes but is not limited to head injury, concussion, syncope, intracranial hemorrhage, C-spine fracture, shoulder fracture, shoulder dislocation    Patient is afebrile with otherwise normal vitals.  Patient was in no acute distress but discomfort from pain.  Valuation revealed tenderness over the the left " shoulder and left neck.  Exam today otherwise benign.    CT of the head, CT of the cervical spine, x-rays of the left shoulder, and rib studies today were all unremarkable for signs of acute fracture or other acute findings.  EKG and laboratory studies reassuring.    Assessment / Plan:   Diagnosis Comments   1. Fall from slip, trip, or stumble, initial encounter        2. Shoulder injury, left, initial encounter  Orthopedic  Referral   Sling given.  Patient to use Norco as needed for pain.  Follow with orthopedics.  Referral entered.      3. Injury of head, initial encounter  Negative head CT.  No signs of intracranial hemorrhage.  Normal neurologic exam with no focal findings.  Currently no indication for MR imaging.  EKG and laboratory studies reassuring.      4. Chest wall contusion, left, initial encounter  No signs of acute fracture.  Lungs are clear.  No signs of pneumothorax.  Pain management as above            Medications given during today's ER visit:  Medications   HYDROmorphone (PF) (DILAUDID) injection 0.5 mg (0.5 mg Intramuscular $Given 10/5/23 1617)       New prescriptions started at today's ER visit  Discharge Medication List as of 10/5/2023  5:41 PM            Pertinent Labs & Imaging studies reviewed. (See chart for details)  Results for orders placed or performed during the hospital encounter of 10/05/23   CT Head w/o Contrast    Impression    Impression:  No acute intracranial abnormality.      DARIANA JACKSON MD         SYSTEM ID:  [email or web address]   CT Cervical Spine w/o Contrast    Impression    IMPRESSION:  No acute fracture or subluxation.    DARIANA JACKSON MD         SYSTEM ID:  [email or web address]   XR Ribs & Chest Lt 3v    Impression    IMPRESSION:      No acute cardiopulmonary process.      No acute rib fracture.    DARIANA JACKSON MD         SYSTEM ID:  [email or web address]   XR Shoulder Left 2 Views    Impression    Impression:  No acute fracture or dislocation.    DARIANA JACKSON MD         SYSTEM  ID:  [email or web address]   Comprehensive metabolic panel   Result Value Ref Range    Sodium 136 135 - 145 mmol/L    Potassium 4.4 3.4 - 5.3 mmol/L    Carbon Dioxide (CO2) 24 22 - 29 mmol/L    Anion Gap 11 7 - 15 mmol/L    Urea Nitrogen 18.7 8.0 - 23.0 mg/dL    Creatinine 0.93 0.67 - 1.17 mg/dL    GFR Estimate 86 >60 mL/min/1.73m2    Calcium 9.2 8.8 - 10.2 mg/dL    Chloride 101 98 - 107 mmol/L    Glucose 166 (H) 70 - 99 mg/dL    Alkaline Phosphatase 115 40 - 129 U/L    AST 25 0 - 45 U/L    ALT 25 0 - 70 U/L    Protein Total 7.7 6.4 - 8.3 g/dL    Albumin 4.4 3.5 - 5.2 g/dL    Bilirubin Total 0.4 <=1.2 mg/dL   Result Value Ref Range    Troponin T, High Sensitivity 50 (H) <=22 ng/L   Result Value Ref Range    Magnesium 1.7 1.7 - 2.3 mg/dL   TSH Reflex GH   Result Value Ref Range    TSH 4.40 (H) 0.30 - 4.20 uIU/mL   Blood gas venous and oxyhgb   Result Value Ref Range    pH Venous 7.36 7.32 - 7.43    pCO2 Venous 45 40 - 50 mm Hg    pO2 Venous 36 25 - 47 mm Hg    Bicarbonate Venous 25 21 - 28 mmol/L    FIO2 21     Oxyhemoglobin Venous 64 (L) 70 - 75 %    Base Excess/Deficit -0.9 -7.7 - 1.9 mmol/L   Nt probnp inpatient (BNP)   Result Value Ref Range    N terminal Pro BNP Inpatient 101 0 - 900 pg/mL   CBC with platelets and differential   Result Value Ref Range    WBC Count 16.9 (H) 4.0 - 11.0 10e3/uL    RBC Count 4.62 4.40 - 5.90 10e6/uL    Hemoglobin 13.3 13.3 - 17.7 g/dL    Hematocrit 39.8 (L) 40.0 - 53.0 %    MCV 86 78 - 100 fL    MCH 28.8 26.5 - 33.0 pg    MCHC 33.4 31.5 - 36.5 g/dL    RDW 13.1 10.0 - 15.0 %    Platelet Count 268 150 - 450 10e3/uL    % Neutrophils 82 %    % Lymphocytes 11 %    % Monocytes 5 %    Mids % (Monos, Eos, Basos)      % Eosinophils 1 %    % Basophils 0 %    % Immature Granulocytes 1 %    NRBCs per 100 WBC 0 <1 /100    Absolute Neutrophils 14.0 (H) 1.6 - 8.3 10e3/uL    Absolute Lymphocytes 1.8 0.8 - 5.3 10e3/uL    Absolute Monocytes 0.8 0.0 - 1.3 10e3/uL    Mids Abs (Monos, Eos, Basos)      Absolute  Eosinophils 0.1 0.0 - 0.7 10e3/uL    Absolute Basophils 0.0 0.0 - 0.2 10e3/uL    Absolute Immature Granulocytes 0.1 <=0.4 10e3/uL    Absolute NRBCs 0.0 10e3/uL   Result Value Ref Range    Free T4 0.99 0.90 - 1.70 ng/dL     No results found for: ABORH      Reassessments, Medications, Interventions, & Response to Treatments:  Pain improved with use of Dilaudid.  Discussed laboratory imaging results with patient.    Consultations:  None    Decision Rules, Medical Calculators, and Risk Stratification Tools:  None    MDM Key Documentation Elements for Patient's Evaluation:  Differential diagnosis to include high risk considerations: As above  Escalation to admission/observation considered: Admission/observation considered, but patient does not meet admission criteria  Discussions and management with other clinicians:    3a. Independent interpretation of testing performed by another health professional:  -No  3b. Discussion of management or test interpretation with another health professional: -No  Independent interpretation of tests:  Ordering and/or review of 3+ test(s)  Discussion of test interpretations with radiology:  No  History obtained from source other than patient or assessment requiring an independent historian:  No  Review of non-ED/external records:  review of 3+ records  Diagnostic tests considered but not ultimately performed/deferred:  -MR head  Prescription medications considered but not prescribed:  -Zofran  Chronic conditions affecting care:  -None  Care affected by social determinants of health:  -None    The patient's management involved:   - Laboratory studies  - Imaging studies  -Intramuscular controlled substance  - Prescription drug management      A shared decision making model was used. Time was taken to answer all questions.  Patient and/or associated parties understood and were agreeable to treatment plan.  Plan and all results were discussed. Warning signs and close return precautions to  return to the ED given. Copy of results given. Discharged in stable condition. Discharged with discharge instructions outlining plan for further care and follow up.      PPE worn during patient evaluation:  Mask: Yes  Eye Protection: No  Gown: No  Hair cover: No  Face Shield: No  Patient wearing a mask: No    =================================================================    HPI  Faisal Muñoz is a pleasant right-hand-dominant 73 year old male who presents to the ER today for evaluation after mechanical trip and fall.  Patient states that he tripped and fell landing directly on his left shoulder and side of his left head.  Patient states that he thinks he briefly lost consciousness.  Patient states he had a brief headache following the accident but currently denying any headache here.  Patient states that he is mainly having pain in this left shoulder.  Patient states he did have rotator cuff injuries and shoulder repairs in both the shoulders.  Pain is described as moderate worse with movement of his shoulder.  Also having some mild right neck and posterior shoulder pain.  No numbness or paresthesias.  Patient with episodes of vomiting.  Patient not feeling lightheaded or dizzy.  No chest pain, shortness of breath, or dyspnea.  Denies heart palpitations      REVIEW OF SYSTEMS   Review of Systems  As above, otherwise ROS is unremarkable.      PAST MEDICAL HISTORY:  Past Medical History:   Diagnosis Date    Adhesive capsulitis of left shoulder     10/31/2017       PAST SURGICAL HISTORY:  History reviewed. No pertinent surgical history.    CURRENT MEDICATIONS:    Current Outpatient Medications   Medication Instructions    amLODIPine-benazepril (LOTREL) 10-20 MG per capsule 10-20 capsules, Oral, DAILY    aspirin 81 mg, Oral, DAILY WITH FOOD    atorvastatin (LIPITOR) 80 mg, Oral, DAILY    blood glucose monitoring (NO BRAND SPECIFIED) test strip As directed.    Blood Glucose Monitoring Suppl (FIFTY50 GLUCOSE METER  "2.0) W/DEVICE KIT As directed.    clopidogrel (PLAVIX) 75 mg, Oral, DAILY    gabapentin (NEURONTIN) 300 mg, Oral, 3 TIMES DAILY    HYDROcodone-acetaminophen (NORCO) 5-325 MG tablet 1 tablet, Oral, EVERY 6 HOURS PRN    insulin glargine (LANTUS SOLOSTAR) 28 Units, Subcutaneous, AT BEDTIME    metFORMIN (GLUCOPHAGE) 500 mg, Oral, 2 TIMES DAILY WITH MEALS    metoprolol succinate ER (TOPROL XL) 50 mg, Oral, DAILY    nitroGLYcerin (NITROSTAT) 0.4 mg, Sublingual, EVERY 5 MIN PRN    predniSONE (DELTASONE) 10 mg, Oral, DAILY, - for 5 to 7 days, for bout pain (will raise blood sugar levels)    traMADol (ULTRAM) 50 mg, Oral, EVERY 6 HOURS PRN       ALLERGIES:  No Known Allergies    FAMILY HISTORY:  History reviewed. No pertinent family history.    SOCIAL HISTORY:   Social History     Socioeconomic History    Marital status:      Spouse name: None    Number of children: None    Years of education: None    Highest education level: None   Tobacco Use    Smoking status: Former     Types: Cigarettes     Quit date: 10/2/2016     Years since quittin.0    Smokeless tobacco: Never   Substance and Sexual Activity    Alcohol use: No     Alcohol/week: 0.0 standard drinks of alcohol    Drug use: Unknown     Types: Other     Comment: Drug use: No   Social History Narrative    Preloaded 3/7/13       PHYSICAL EXAM    VITAL SIGNS: BP (!) 149/80   Pulse 78   Temp 98  F (36.7  C)   Resp 16   Ht 1.905 m (6' 3\")   Wt 106.6 kg (235 lb)   SpO2 98%   BMI 29.37 kg/m      Patient Vitals for the past 24 hrs:   BP Temp Pulse Resp SpO2 Height Weight   10/05/23 1524 (!) 149/80 -- -- -- -- -- --   10/05/23 1523 -- 98  F (36.7  C) 78 16 98 % 1.905 m (6' 3\") 106.6 kg (235 lb)       Physical Exam  Vitals and nursing note reviewed.   Constitutional:       Appearance: Normal appearance. He is obese. He is not ill-appearing.   HENT:      Head: Normocephalic and atraumatic.      Right Ear: Tympanic membrane, ear canal and external ear normal.      " Left Ear: Tympanic membrane, ear canal and external ear normal.      Nose: Nose normal.      Mouth/Throat:      Mouth: Mucous membranes are moist.      Pharynx: Oropharynx is clear.   Eyes:      Extraocular Movements: Extraocular movements intact.      Conjunctiva/sclera: Conjunctivae normal.      Pupils: Pupils are equal, round, and reactive to light.   Cardiovascular:      Rate and Rhythm: Normal rate and regular rhythm.      Pulses: Normal pulses.      Heart sounds: Normal heart sounds. No murmur heard.     No friction rub. No gallop.   Pulmonary:      Effort: Pulmonary effort is normal.      Breath sounds: Normal breath sounds. No wheezing, rhonchi or rales.   Chest:      Chest wall: Tenderness (Patient with left posterior back/chest wall tenderness without crepitus.  No signs of full chest.  Lungs are clear.) present.   Abdominal:      General: Abdomen is flat. Bowel sounds are normal.      Palpations: Abdomen is soft.      Tenderness: There is no abdominal tenderness. There is no guarding or rebound.   Musculoskeletal:      Cervical back: Normal range of motion and neck supple. No rigidity or tenderness (No midline C-spine tenderness.  Right lateral neck tenderness without bruising or swelling.  Patient with full active range of motion of the neck.).      Comments: Pelvis was stable and nontender.  Patient with left lateral shoulder tenderness.  Patient with range of motion up to roughly parallel to the ground.  No left elbow tenderness.  No right shoulder tenderness.  Patient with tenderness over the SC and AC joints.  No clavicular tenderness.  No skin blanching or tenting.  Patient was neurovascularly intact in the upper extremities   Skin:     General: Skin is warm and dry.      Capillary Refill: Capillary refill takes less than 2 seconds.   Neurological:      General: No focal deficit present.      Mental Status: He is alert and oriented to person, place, and time.      Cranial Nerves: No cranial nerve  deficit.      Sensory: No sensory deficit.      Motor: No weakness.   Psychiatric:         Mood and Affect: Mood normal.              LABS & RADIOLOGY:  All pertinent labs reviewed and interpreted. Reviewed all pertinent imaging. Please see official radiology report.  Results for orders placed or performed during the hospital encounter of 10/05/23   CT Head w/o Contrast    Impression    Impression:  No acute intracranial abnormality.      DARIANA JACKSON MD         SYSTEM ID:  [email or web address]   CT Cervical Spine w/o Contrast    Impression    IMPRESSION:  No acute fracture or subluxation.    DARIANA JACKSON MD         [facility] ID:  [email or web address]   XR Ribs & Chest Lt 3v    Impression    IMPRESSION:      No acute cardiopulmonary process.      No acute rib fracture.    DARIANA JACKSON MD         [facility] ID:  [email or web address]   XR Shoulder Left 2 Views    Impression    Impression:  No acute fracture or dislocation.    DARIANA JACKSON MD         [facility] ID:  [email or web address]   Comprehensive metabolic panel   Result Value Ref Range    Sodium 136 135 - 145 mmol/L    Potassium 4.4 3.4 - 5.3 mmol/L    Carbon Dioxide (CO2) 24 22 - 29 mmol/L    Anion Gap 11 7 - 15 mmol/L    Urea Nitrogen 18.7 8.0 - 23.0 mg/dL    Creatinine 0.93 0.67 - 1.17 mg/dL    GFR Estimate 86 >60 mL/min/1.73m2    Calcium 9.2 8.8 - 10.2 mg/dL    Chloride 101 98 - 107 mmol/L    Glucose 166 (H) 70 - 99 mg/dL    Alkaline Phosphatase 115 40 - 129 U/L    AST 25 0 - 45 U/L    ALT 25 0 - 70 U/L    Protein Total 7.7 6.4 - 8.3 g/dL    Albumin 4.4 3.5 - 5.2 g/dL    Bilirubin Total 0.4 <=1.2 mg/dL   Result Value Ref Range    Troponin T, High Sensitivity 50 (H) <=22 ng/L   Result Value Ref Range    Magnesium 1.7 1.7 - 2.3 mg/dL   TSH Reflex GH   Result Value Ref Range    TSH 4.40 (H) 0.30 - 4.20 uIU/mL   Blood gas venous and oxyhgb   Result Value Ref Range    pH Venous 7.36 7.32 - 7.43    pCO2 Venous 45 40 - 50 mm Hg    pO2 Venous 36 25 - 47 mm Hg    Bicarbonate Venous 25 21 - 28 mmol/L     FIO2 21     Oxyhemoglobin Venous 64 (L) 70 - 75 %    Base Excess/Deficit -0.9 -7.7 - 1.9 mmol/L   Nt probnp inpatient (BNP)   Result Value Ref Range    N terminal Pro BNP Inpatient 101 0 - 900 pg/mL   CBC with platelets and differential   Result Value Ref Range    WBC Count 16.9 (H) 4.0 - 11.0 10e3/uL    RBC Count 4.62 4.40 - 5.90 10e6/uL    Hemoglobin 13.3 13.3 - 17.7 g/dL    Hematocrit 39.8 (L) 40.0 - 53.0 %    MCV 86 78 - 100 fL    MCH 28.8 26.5 - 33.0 pg    MCHC 33.4 31.5 - 36.5 g/dL    RDW 13.1 10.0 - 15.0 %    Platelet Count 268 150 - 450 10e3/uL    % Neutrophils 82 %    % Lymphocytes 11 %    % Monocytes 5 %    Mids % (Monos, Eos, Basos)      % Eosinophils 1 %    % Basophils 0 %    % Immature Granulocytes 1 %    NRBCs per 100 WBC 0 <1 /100    Absolute Neutrophils 14.0 (H) 1.6 - 8.3 10e3/uL    Absolute Lymphocytes 1.8 0.8 - 5.3 10e3/uL    Absolute Monocytes 0.8 0.0 - 1.3 10e3/uL    Mids Abs (Monos, Eos, Basos)      Absolute Eosinophils 0.1 0.0 - 0.7 10e3/uL    Absolute Basophils 0.0 0.0 - 0.2 10e3/uL    Absolute Immature Granulocytes 0.1 <=0.4 10e3/uL    Absolute NRBCs 0.0 10e3/uL   Result Value Ref Range    Free T4 0.99 0.90 - 1.70 ng/dL     CT Cervical Spine w/o Contrast   Final Result   IMPRESSION:   No acute fracture or subluxation.      DARIANA JACKSON MD            [facility] ID:  [email or web address]      CT Head w/o Contrast   Final Result   Impression:   No acute intracranial abnormality.        DARIANA JACKSON MD            [facility] ID:  [email or web address]      XR Shoulder Left 2 Views   Final Result   Impression:   No acute fracture or dislocation.      DARIANA JACKSON MD            [facility] ID:  [email or web address]      XR Ribs & Chest Lt 3v   Final Result   IMPRESSION:        No acute cardiopulmonary process.        No acute rib fracture.      DARIANA JACKSON MD            [facility] ID:  [email or web address]            EK2021 EKG available for comparison. EKG reviewed at 1653.  1) Rhythm: NSR  2) Rate: ventricular rate 72 bpm.  3) QRS  Axis: No axis deviation  4) P waves/ IA interval: Sinus. Nml RENE. No atrial enlargement  5) QRS complex: Wide. No BBB.  Left ventricular hypertrophy. No pathological Q waves.  6) ST Segment: No acute ST segment elevation or depression  7) T waves: No T wave inversions. No peaked or flattened T waves.     I have independently reviewed and interpreted today's EKG, pending cardiologist over read.           I, Dre Paniagua PA-C, personally performed the services described in this documentation, and it is both accurate and complete.       Michael Paniagua PA-C  10/05/23 2759

## 2023-10-08 LAB
ATRIAL RATE - MUSE: 72 BPM
DIASTOLIC BLOOD PRESSURE - MUSE: NORMAL MMHG
INTERPRETATION ECG - MUSE: NORMAL
P AXIS - MUSE: 35 DEGREES
PR INTERVAL - MUSE: 166 MS
QRS DURATION - MUSE: 122 MS
QT - MUSE: 390 MS
QTC - MUSE: 427 MS
R AXIS - MUSE: -57 DEGREES
SYSTOLIC BLOOD PRESSURE - MUSE: NORMAL MMHG
T AXIS - MUSE: -9 DEGREES
VENTRICULAR RATE- MUSE: 72 BPM

## 2023-10-25 ENCOUNTER — OFFICE VISIT (OUTPATIENT)
Dept: FAMILY MEDICINE | Facility: OTHER | Age: 73
End: 2023-10-25
Attending: PHYSICIAN ASSISTANT
Payer: COMMERCIAL

## 2023-10-25 VITALS
OXYGEN SATURATION: 94 % | BODY MASS INDEX: 30.57 KG/M2 | TEMPERATURE: 98.9 F | SYSTOLIC BLOOD PRESSURE: 138 MMHG | WEIGHT: 244.6 LBS | DIASTOLIC BLOOD PRESSURE: 80 MMHG | HEART RATE: 91 BPM | RESPIRATION RATE: 16 BRPM

## 2023-10-25 DIAGNOSIS — M54.12 CERVICAL RADICULOPATHY: ICD-10-CM

## 2023-10-25 DIAGNOSIS — M62.838 TRAPEZIUS MUSCLE SPASM: ICD-10-CM

## 2023-10-25 DIAGNOSIS — S40.012A CONTUSION OF LEFT SHOULDER, INITIAL ENCOUNTER: Primary | ICD-10-CM

## 2023-10-25 PROCEDURE — G0463 HOSPITAL OUTPT CLINIC VISIT: HCPCS

## 2023-10-25 PROCEDURE — 99214 OFFICE O/P EST MOD 30 MIN: CPT | Performed by: FAMILY MEDICINE

## 2023-10-25 RX ORDER — CYCLOBENZAPRINE HCL 10 MG
10 TABLET ORAL 3 TIMES DAILY PRN
Qty: 90 TABLET | Refills: 0 | Status: SHIPPED | OUTPATIENT
Start: 2023-10-25 | End: 2023-10-26

## 2023-10-25 ASSESSMENT — PAIN SCALES - GENERAL: PAINLEVEL: SEVERE PAIN (7)

## 2023-10-25 NOTE — PROGRESS NOTES
Sports Medicine Office Note    HPI:  73-year-old male coming in for evaluation of left shoulder pain.  His pain began on 10/5.  He fell while walking in the woods.  He fell onto his left shoulder, he hit his head.  He reports loss of consciousness.  He reports that it started to rain and that is what woke him up.  He was seen in the ER.  X-rays of his shoulder were performed and were negative for fracture.  He also had a CT of his head and neck which were negative for acute pathology.  Over the last month his pain has persisted.  He currently rates his pain at 7/10.  He characterizes the pain as sharp.  He has difficulty lifting his arm above his head.  His symptoms affect sleep.  He has been using ice.  He has associated bruising and swelling.  He does note some numbness extending down into his ulnar 3 digits, starting initially with the fifth digit and now involving all 3.      EXAM:  /80 (BP Location: Right arm, Patient Position: Sitting, Cuff Size: Adult Regular)   Pulse 91   Temp 98.9  F (37.2  C) (Temporal)   Resp 16   Wt 110.9 kg (244 lb 9.6 oz)   SpO2 94%   BMI 30.57 kg/m    MUSCULOSKELETAL EXAM:  LEFT SHOULDER  Inspection:  -No gross deformity  -Mild bruising about the anterior shoulder and left-sided chest  -Scars:  None    Tenderness:  - Diffusely tender throughout    Range of Motion:  -Active flexion:  120 left, 150 right  -Active abduction:  120 left (170 passively), 150 right    Strength:  -Supraspinatus:  5/5  -Infraspinatus:  5/5  -Subscapularis:  5/5  -Deltoid:  5/5    Special Tests:  -Jessica test: Positive pain without weakness  -Morales test: Positive  -Mid-arc pain: Present  -Lag sign:  Negative    Other:  -Intact sensation to light touch distally.  -No signs of cyanosis. Normal skin temperature of the upper extremity.  -Elbow:  No gross deformity. Full range of motion.  -Hand/wrist:  No gross deformity. Full range of motion.  -Right shoulder:  No gross deformity. No palpable  tenderness. Normal strength.      IMAGING:  10/5/2023: 2 view left shoulder x-ray  - No fracture, dislocation, or bony lesion.  Moderate degenerative changes of the AC joint.      ASSESSMENT/PLAN:  Diagnoses and all orders for this visit:  Contusion of left shoulder, initial encounter  -     Orthopedic  Referral  -     Physical Therapy Referral; Future  Trapezius muscle spasm  -     cyclobenzaprine (FLEXERIL) 10 MG tablet; Take 1 tablet (10 mg) by mouth 3 times daily as needed for muscle spasms Start 1 nightly as needed and titrate up to 3 times a day as needed as medication is tolerated  -     Physical Therapy Referral; Future  Cervical radiculopathy  -     Physical Therapy Referral; Future    73-year-old male with a left shoulder contusion.  Because of the jarring effect of the injury, he likely is now suffering some spasming of the trapezius musculature.  Straightening of the C-spine vertebrae seen on cervical imaging from 10/5 is suggestive of muscle spasming (though more commonly chronic in nature).  With this additional strain on the C-spine, he is likely now suffering some lower cervical nerve root impingement causing the symptoms he is experiencing into the left hand.  Shoulder x-rays from 10/5 were personally reviewed in the office with findings as demonstrated above by my interpretation.  - Referral placed to physical therapy  - Cyclobenzaprine 10 mg 3 times daily as needed  - Handout given with home exercises for neck muscle spasming  - Activities as tolerated  - Follow-up if symptoms or not improving after 4-6 weeks of physical therapy      Misha Singh MD  10/25/2023  11:07 AM    Total time spent with this patient was 27 minutes which included chart review, visualization and independent interpretation of images, time spent with the patient, and documentation.    Procedure time:  0 minute(s)

## 2023-10-26 ENCOUNTER — TELEPHONE (OUTPATIENT)
Dept: FAMILY MEDICINE | Facility: OTHER | Age: 73
End: 2023-10-26
Payer: COMMERCIAL

## 2023-10-26 DIAGNOSIS — M62.838 TRAPEZIUS MUSCLE SPASM: Primary | ICD-10-CM

## 2023-10-26 RX ORDER — TIZANIDINE 2 MG/1
2 TABLET ORAL 3 TIMES DAILY PRN
Qty: 90 TABLET | Refills: 0 | Status: SHIPPED | OUTPATIENT
Start: 2023-10-26

## 2025-03-26 ENCOUNTER — NURSE TRIAGE (OUTPATIENT)
Dept: FAMILY MEDICINE | Facility: OTHER | Age: 75
End: 2025-03-26
Payer: COMMERCIAL

## 2025-03-26 ENCOUNTER — HOSPITAL ENCOUNTER (EMERGENCY)
Facility: OTHER | Age: 75
Discharge: LEFT BEFORE DISCHARGE | End: 2025-03-26
Payer: COMMERCIAL

## 2025-03-26 VITALS
SYSTOLIC BLOOD PRESSURE: 143 MMHG | OXYGEN SATURATION: 97 % | DIASTOLIC BLOOD PRESSURE: 73 MMHG | HEIGHT: 75 IN | WEIGHT: 264 LBS | RESPIRATION RATE: 23 BRPM | TEMPERATURE: 97.8 F | BODY MASS INDEX: 32.83 KG/M2 | HEART RATE: 73 BPM

## 2025-03-26 DIAGNOSIS — M25.512 ACUTE PAIN OF LEFT SHOULDER: ICD-10-CM

## 2025-03-26 DIAGNOSIS — M54.2 NECK PAIN ON LEFT SIDE: ICD-10-CM

## 2025-03-26 LAB
ALBUMIN SERPL BCG-MCNC: 4.4 G/DL (ref 3.5–5.2)
ALP SERPL-CCNC: 124 U/L (ref 40–150)
ALT SERPL W P-5'-P-CCNC: 36 U/L (ref 0–70)
ANION GAP SERPL CALCULATED.3IONS-SCNC: 13 MMOL/L (ref 7–15)
AST SERPL W P-5'-P-CCNC: 25 U/L (ref 0–45)
BASOPHILS # BLD AUTO: 0 10E3/UL (ref 0–0.2)
BASOPHILS NFR BLD AUTO: 0 %
BILIRUB SERPL-MCNC: 0.5 MG/DL
BUN SERPL-MCNC: 11.8 MG/DL (ref 8–23)
CALCIUM SERPL-MCNC: 9.2 MG/DL (ref 8.8–10.4)
CHLORIDE SERPL-SCNC: 99 MMOL/L (ref 98–107)
CREAT SERPL-MCNC: 0.88 MG/DL (ref 0.67–1.17)
D DIMER PPP FEU-MCNC: 0.55 UG/ML FEU (ref 0–0.5)
EGFRCR SERPLBLD CKD-EPI 2021: 90 ML/MIN/1.73M2
EOSINOPHIL # BLD AUTO: 0.1 10E3/UL (ref 0–0.7)
EOSINOPHIL NFR BLD AUTO: 1 %
ERYTHROCYTE [DISTWIDTH] IN BLOOD BY AUTOMATED COUNT: 13.6 % (ref 10–15)
GLUCOSE SERPL-MCNC: 211 MG/DL (ref 70–99)
HCO3 SERPL-SCNC: 23 MMOL/L (ref 22–29)
HCT VFR BLD AUTO: 39.1 % (ref 40–53)
HGB BLD-MCNC: 13.1 G/DL (ref 13.3–17.7)
HOLD SPECIMEN: NORMAL
IMM GRANULOCYTES # BLD: 0.1 10E3/UL
IMM GRANULOCYTES NFR BLD: 1 %
LYMPHOCYTES # BLD AUTO: 2.1 10E3/UL (ref 0.8–5.3)
LYMPHOCYTES NFR BLD AUTO: 25 %
MCH RBC QN AUTO: 28.5 PG (ref 26.5–33)
MCHC RBC AUTO-ENTMCNC: 33.5 G/DL (ref 31.5–36.5)
MCV RBC AUTO: 85 FL (ref 78–100)
MONOCYTES # BLD AUTO: 0.6 10E3/UL (ref 0–1.3)
MONOCYTES NFR BLD AUTO: 7 %
NEUTROPHILS # BLD AUTO: 5.7 10E3/UL (ref 1.6–8.3)
NEUTROPHILS NFR BLD AUTO: 66 %
NRBC # BLD AUTO: 0 10E3/UL
NRBC BLD AUTO-RTO: 0 /100
NT-PROBNP SERPL-MCNC: 118 PG/ML (ref 0–900)
PLATELET # BLD AUTO: 276 10E3/UL (ref 150–450)
POTASSIUM SERPL-SCNC: 4.1 MMOL/L (ref 3.4–5.3)
PROT SERPL-MCNC: 7.9 G/DL (ref 6.4–8.3)
RBC # BLD AUTO: 4.59 10E6/UL (ref 4.4–5.9)
SODIUM SERPL-SCNC: 135 MMOL/L (ref 135–145)
TROPONIN T SERPL HS-MCNC: 14 NG/L
WBC # BLD AUTO: 8.6 10E3/UL (ref 4–11)

## 2025-03-26 PROCEDURE — 99283 EMERGENCY DEPT VISIT LOW MDM: CPT

## 2025-03-26 PROCEDURE — 93005 ELECTROCARDIOGRAM TRACING: CPT

## 2025-03-26 PROCEDURE — 99284 EMERGENCY DEPT VISIT MOD MDM: CPT

## 2025-03-26 PROCEDURE — 83880 ASSAY OF NATRIURETIC PEPTIDE: CPT

## 2025-03-26 PROCEDURE — 80053 COMPREHEN METABOLIC PANEL: CPT

## 2025-03-26 PROCEDURE — 84484 ASSAY OF TROPONIN QUANT: CPT

## 2025-03-26 PROCEDURE — 85379 FIBRIN DEGRADATION QUANT: CPT

## 2025-03-26 PROCEDURE — 36415 COLL VENOUS BLD VENIPUNCTURE: CPT

## 2025-03-26 PROCEDURE — 80051 ELECTROLYTE PANEL: CPT

## 2025-03-26 PROCEDURE — 85018 HEMOGLOBIN: CPT

## 2025-03-26 PROCEDURE — 93010 ELECTROCARDIOGRAM REPORT: CPT | Performed by: INTERNAL MEDICINE

## 2025-03-26 PROCEDURE — 85004 AUTOMATED DIFF WBC COUNT: CPT

## 2025-03-26 ASSESSMENT — ACTIVITIES OF DAILY LIVING (ADL): ADLS_ACUITY_SCORE: 41

## 2025-03-26 ASSESSMENT — COLUMBIA-SUICIDE SEVERITY RATING SCALE - C-SSRS
6. HAVE YOU EVER DONE ANYTHING, STARTED TO DO ANYTHING, OR PREPARED TO DO ANYTHING TO END YOUR LIFE?: NO
2. HAVE YOU ACTUALLY HAD ANY THOUGHTS OF KILLING YOURSELF IN THE PAST MONTH?: NO
1. IN THE PAST MONTH, HAVE YOU WISHED YOU WERE DEAD OR WISHED YOU COULD GO TO SLEEP AND NOT WAKE UP?: NO

## 2025-03-26 ASSESSMENT — ENCOUNTER SYMPTOMS
NECK PAIN: 1
ARTHRALGIAS: 1

## 2025-03-26 NOTE — ED PROVIDER NOTES
History     Chief Complaint   Patient presents with    Neck Pain    Shoulder Pain     The history is provided by the patient.     Faisal Muñoz is a 74 year old male with a heart history with CAD, prior PCI to the PDA and  RCA back in 2021, EF 55-60% 4/2021, HTN, DM, obesity who presents from [facility] with left shoulder and neck pain. Sharp shooting pains started in his left shoulder and went into the left side of his neck. The pain started around 11 am today while at rest. The pain was initially constant. The pain comes and goes now and currently has no pain. He reports dizziness this am with the pain. Denies nausea or vomiting. Denies any injuries or trauma. Denies SOB. No recent upper respiratory illness.      Allergies:  No Known Allergies    Problem List:    Patient Active Problem List    Diagnosis Date Noted    Long term (current) use of opiate analgesic 09/28/2022     Priority: Medium    Muscle weakness of right arm 07/06/2022     Priority: Medium    Range of motion deficit 07/06/2022     Priority: Medium    Nontraumatic complete tear of right rotator cuff 05/13/2022     Priority: Medium    Coronary artery disease of native artery of native heart with stable angina pectoris 12/20/2021     Priority: Medium    Anemia 11/10/2021     Priority: Medium    Encounter for long-term (current) use of insulin (H) 07/27/2021     Priority: Medium    Angina of effort 04/02/2021     Priority: Medium     Formatting of this note might be different from the original.  Added automatically from request for surgery [id number]      Former smoker 06/19/2019     Priority: Medium     Formatting of this note might be different from the original.  Smoked < 1ppd for 50 years. Pack used to last a week until fall 2016 when started to smoke heavily x 1 month. Then quit cold turkey. Hasn't smoked since.      Adhesive capsulitis of left shoulder 10/31/2017     Priority: Medium    S/P drug eluting coronary stent placement 08/11/2017     Priority:  Medium     Formatting of this note might be different from the original.   PCI of distal RCA into PDA with a 2.75 x 18 mm Resolute CRISTOFER; mid RCA stented wtih a 3.0 x 26 mm Resolute CRISTOFER; large PLVB dilated with a 3.0 x 15 mm balloon with a nice result.  He had normal LVEDP. Plavix and ASA x 1 year. ASA indefinitely.      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  IMO Update  Updated per 10/1/17 IMO import  Replacing diagnoses that were inactivated after the 10/1/2021 regulatory import.      YOUNG (nonalcoholic steatohepatitis) 2012     Priority: Medium     Formatting of this note might be different from the original.  IMO Update 10/11      Essential hypertension 2006     Priority: Medium     Formatting of this note might be different from the original.  DR[facility]/ER -        Past Medical History:    Past Medical History:   Diagnosis Date    Adhesive capsulitis of left shoulder      Past Surgical History:    No past surgical history on file.    Family History:    No family history on file.    Social History:  Marital Status:   [2]  Social History     Tobacco Use    Smoking status: Former     Current packs/day: 0.00     Types: Cigarettes     Quit date: 10/2/2016     Years since quittin.4    Smokeless tobacco: Never   Substance Use Topics    Alcohol use: No     Alcohol/week: 0.0 standard drinks of alcohol    Drug use: Unknown     Types: Other     Comment: Drug use: No        Medications:    amLODIPine-benazepril (LOTREL) 10-20 MG per capsule  aspirin EC 81 MG EC  "tablet  atorvastatin (LIPITOR) 80 MG tablet  blood glucose monitoring (NO BRAND SPECIFIED) test strip  Blood Glucose Monitoring Suppl (FIFTY50 GLUCOSE METER 2.0) W/DEVICE KIT  clopidogrel (PLAVIX) 75 MG tablet  gabapentin (NEURONTIN) 300 MG capsule  HYDROcodone-acetaminophen (NORCO) 5-325 MG tablet  insulin glargine (LANTUS SOLOSTAR) 100 UNIT/ML injection  metFORMIN (GLUCOPHAGE) 500 MG tablet  metoprolol succinate (TOPROL-XL) 50 MG 24 hr tablet  nitroGLYcerin (NITROSTAT) 0.4 MG sublingual tablet  predniSONE (DELTASONE) 10 MG tablet  tiZANidine (ZANAFLEX) 2 MG tablet  traMADol (ULTRAM) 50 MG tablet      Review of Systems   Musculoskeletal:  Positive for arthralgias and neck pain.        Left shoulder pain.    All other systems reviewed and are negative.    Physical Exam   BP: (!) 189/93  Pulse: 80  Temp: 97.8  F (36.6  C)  Resp: 24  Height: 190.5 cm (6' 3\")  Weight: 119.7 kg (264 lb)  SpO2: 97 %    Physical Exam  Vitals and nursing note reviewed.   Constitutional:       General: He is not in acute distress.     Appearance: Normal appearance. He is not ill-appearing.   HENT:      Head: Normocephalic.      Nose: Nose normal.      Mouth/Throat:      Mouth: Mucous membranes are moist.   Eyes:      Conjunctiva/sclera: Conjunctivae normal.   Cardiovascular:      Rate and Rhythm: Normal rate and regular rhythm.      Pulses: Normal pulses.      Heart sounds: Normal heart sounds.   Pulmonary:      Effort: Pulmonary effort is normal.      Breath sounds: Normal breath sounds.   Abdominal:      General: Bowel sounds are normal. There is distension.      Palpations: Abdomen is soft.      Tenderness: There is no abdominal tenderness.   Musculoskeletal:         General: Normal range of motion.      Cervical back: Normal range of motion.      Right lower leg: Edema present.      Left lower leg: Edema present.   Skin:     General: Skin is warm and dry.      Capillary Refill: Capillary refill takes less than 2 seconds.   Neurological: "      General: No focal deficit present.      Mental Status: He is alert and oriented to person, place, and time. Mental status is at baseline.   Psychiatric:         Mood and Affect: Mood normal.            Results for orders placed or performed during the hospital encounter of 03/26/25 (from the past 24 hours)   CBC with platelets differential    Narrative    The following orders were created for panel order CBC with platelets differential.  Procedure                               Abnormality         Status                     ---------                               -----------         ------                     CBC with platelets and ...[[id number]]  Abnormal            Final result                 Please view results for these tests on the individual orders.   D dimer quantitative   Result Value Ref Range    D-Dimer Quantitative 0.55 (H) 0.00 - 0.50 ug/mL FEU    Narrative    This D-dimer assay is intended for use in conjunction with a clinical pretest probability assessment model to exclude pulmonary embolism (PE) and deep venous thrombosis (DVT) in outpatients suspected of PE or DVT. The cut-off value is 0.50 ug/mL FEU.    For patients 50 years of age or older, the application of age-adjusted cut-off values for D-Dimer may increase the specificity without significant effect on sensitivity. The literature suggested calculation age adjusted cut-off in ug/L = age in years x 10 ug/L. The results in this laboratory are reported as ug/mL rather than ug/L. The calculation for age adjusted cut off in ug/mL= age in years x 0.01 ug/mL. For example, the cut off for a 76 year old male is 76 x 0.01 ug/mL = 0.76 ug/mL (760 ug/L).    M Allyson et al. Age adjusted D-dimer cut-off levels to rule out pulmonary embolism: The ADJUST-PE Study. GENEVA 2014;311:2574-1680.; HJ Nevaeh et al. Diagnostic accuracy of conventional or age adjusted D-dimer cutoff values in older patients with suspected venous thromboembolism. Systemic review  and meta-analysis. BMJ 2013:346:f2492.   Comprehensive metabolic panel   Result Value Ref Range    Sodium 135 135 - 145 mmol/L    Potassium 4.1 3.4 - 5.3 mmol/L    Carbon Dioxide (CO2) 23 22 - 29 mmol/L    Anion Gap 13 7 - 15 mmol/L    Urea Nitrogen 11.8 8.0 - 23.0 mg/dL    Creatinine 0.88 0.67 - 1.17 mg/dL    GFR Estimate 90 >60 mL/min/1.73m2    Calcium 9.2 8.8 - 10.4 mg/dL    Chloride 99 98 - 107 mmol/L    Glucose 211 (H) 70 - 99 mg/dL    Alkaline Phosphatase 124 40 - 150 U/L    AST 25 0 - 45 U/L    ALT 36 0 - 70 U/L    Protein Total 7.9 6.4 - 8.3 g/dL    Albumin 4.4 3.5 - 5.2 g/dL    Bilirubin Total 0.5 <=1.2 mg/dL   Troponin T, High Sensitivity   Result Value Ref Range    Troponin T, High Sensitivity 14 <=22 ng/L   CBC with platelets and differential   Result Value Ref Range    WBC Count 8.6 4.0 - 11.0 10e3/uL    RBC Count 4.59 4.40 - 5.90 10e6/uL    Hemoglobin 13.1 (L) 13.3 - 17.7 g/dL    Hematocrit 39.1 (L) 40.0 - 53.0 %    MCV 85 78 - 100 fL    MCH 28.5 26.5 - 33.0 pg    MCHC 33.5 31.5 - 36.5 g/dL    RDW 13.6 10.0 - 15.0 %    Platelet Count 276 150 - 450 10e3/uL    % Neutrophils 66 %    % Lymphocytes 25 %    % Monocytes 7 %    % Eosinophils 1 %    % Basophils 0 %    % Immature Granulocytes 1 %    NRBCs per 100 WBC 0 <1 /100    Absolute Neutrophils 5.7 1.6 - 8.3 10e3/uL    Absolute Lymphocytes 2.1 0.8 - 5.3 10e3/uL    Absolute Monocytes 0.6 0.0 - 1.3 10e3/uL    Absolute Eosinophils 0.1 0.0 - 0.7 10e3/uL    Absolute Basophils 0.0 0.0 - 0.2 10e3/uL    Absolute Immature Granulocytes 0.1 <=0.4 10e3/uL    Absolute NRBCs 0.0 10e3/uL   Extra Tube    Narrative    The following orders were created for panel order Extra Tube.  Procedure                               Abnormality         Status                     ---------                               -----------         ------                     Extra Red Top Tube[[id number]]                              In process                   Please view results for these tests  "on the individual orders.       Medications - No data to display    Assessments & Plan (with Medical Decision Making)  Faisal Muñoz is a 74 year old male with a heart history with CAD, prior PCI to the PDA and  RCA back in 2021, EF 55-60% 4/2021, HTN, DM, obesity who presents from who with left shoulder and neck pain. Sharp shooting pains started in his left shoulder and went into the left side of his neck. The pain started around 11 am today while at rest. The pain was initially constant. The pain comes and goes now and currently has no pain. He reports dizziness this am with the pain. Denies nausea or vomiting. Denies any injuries or trauma. Denies SOB. No recent upper respiratory illness.    VS in the ED. BP (!) 143/73   Pulse 73   Temp 97.8  F (36.6  C)   Resp 23   Ht 1.905 m (6' 3\")   Wt 119.7 kg (264 lb)   SpO2 97%   BMI 33.00 kg/m    Diagnostics:    Lab: D-dimer 0.55. hemoglobin 13.1.     CT scan:   CT PE    EKG  Rhythm- normal sinus rhythm  Rate- 71  Axis- normal.   Any abnormal   No significant change when compared to previous EKG.   I have independently reviewed and interpreted today's EKG, pending cardiologist over read.    ED Course as of 03/26/25 1833   Wed Mar 26, 2025   1831 Pt is leaving AMA, he said he does not drive at night. Discussed he is at increased risk of his condition worsening or even death. He would like to leave AMA. If his chest pain returns he will call 911.        Faisal is a 75 y/o male evaluated today for left shoulder and neck pain concerns. DDx are broad including ACS, pneumonia, musculoskeletal, PE, cervical radiculopathy.     He is awake and alert in no acute distress.         He left against medical advice. He does not drive at night and cannot see. I have met with the patient, and he/she has decided to leave against medical advice.  The patient is clinically sober and appears to have the capacity to make decisions (1). I have explained my concerns about what these " symptoms could represent (2). He/She understands the extent and limitations of my exam, as well as my recommendations to *** (3 and 4).  The patient understands the risks of foregoing further treatment may include, but are not limited to, death and permanent disability (5).  I have offered the following alternatives to my suggested treatment: *** (6).  The patient is unwilling to stay for further workup and treatment and is refusing further care because ***.  The patient understands he/she is leaving against medical advice (7).  The patient also understands that he/she is welcome to come back at any time if he/she changes his/her mind.  I have discussed danger signs at length with the patient and all questions were fully answered (8).  I offered to help the patient with follow up.          I have reviewed the nursing notes.    I have reviewed the findings, diagnosis, plan and need for follow up with the patient.  Medical Decision Making  The patient's presentation was of {[facility] Problem:624873}.    The patient's evaluation involved:  {[facility] Data:403288}    The patient's management necessitated {[facility] Management:020025}.    Final diagnoses:   Acute pain of left shoulder   Neck pain on left side     3/26/2025   [facility]     this encounter (see separate area of note for details)    The patient's management necessitated only low risk treatment.    Final diagnoses:   Acute pain of left shoulder   Neck pain on left side     3/26/2025   [facility] AND \[facility]\""No olvera, ZAKIA DAUGHERTY  03/28/25 0942     Azelaic Acid Pregnancy And Lactation Text: This medication is considered safe during pregnancy and breast feeding.

## 2025-03-26 NOTE — TELEPHONE ENCOUNTER
"Patient called due to severe left shoulder pain that started around 11:00 am today and has been worsening, no injuries or over use.  Pain started moving up the left side of his neck and he does have shortness of breath and a little dizzy.  He has 3-4 stents from previous blockage and was told that one is 75% blocked but it was too deep and they couldn't do anything about it so he got to thinking about that.  Triager asked if he had nitroglycerin he could take and he said he has it but he took Sildenafil this morning and was told not to take nitroglycerin after taking this medication, confirmed he should wait 24 hours before taking it.  Sildenafil is not on his medlist here but he said he got it at [facility] and it's 100 mg.  Protocol says he should go to ED now and he agreed that is a good plan, will be getting a ride now to [facility] ED.  Gosia Pelletier RN on 3/26/2025 at 2:07 PM       Reason for Disposition   Age > 40 and no obvious cause and pain even when not moving the arm  (Exception: Pain is clearly made worse by moving arm or bending neck.)    Additional Information   Negative: Shock suspected (e.g., cold/pale/clammy skin, too weak to stand, low BP, rapid pulse)   Negative: Similar pain previously and it was from 'heart attack'   Negative: Similar pain previously and it was from 'angina' and not relieved by nitroglycerin   Negative: Chest pain   Negative: Followed an injury to shoulder   Negative: Difficulty breathing or unusual sweating (e.g., sweating without exertion)   Negative: Pain lasting > 5 minutes and pain also present in chest  (Exception: Pain is clearly made worse by movement.)    Answer Assessment - Initial Assessment Questions  1. ONSET: \"When did the pain start?\"      11:00 this morning   2. LOCATION: \"Where is the pain located?\"      Left shoulder and moving up to neck   3. PAIN: \"How bad is the pain?\" (Scale 1-10; or mild, moderate, severe)      7-8 \"constant, worsening\"  4. WORK OR " "EXERCISE: \"Has there been any recent work or exercise that involved this part of the body?\"      No  5. CAUSE: \"What do you think is causing the shoulder pain?\"      No idea, that is why he called.  He has a history of stents (3-4) and blockage.  In 2017 and 2021.  He has one that is \"75% blocked but too far to get it stented, so got to thinking about that\"  6. OTHER SYMPTOMS: \"Do you have any other symptoms?\" (e.g., neck pain, swelling, rash, fever, numbness, weakness)      Neck, moved to neck-left side, shortness of breath, little dizzy, little weak maybe  7. PREGNANCY: \"Is there any chance you are pregnant?\" \"When was your last menstrual period?\"      N/A    Protocols used: Shoulder Pain-A-[place]    "

## 2025-03-26 NOTE — ED TRIAGE NOTES
"Pt arrives with concerns of left shoulder pain and neck pain that started this morning. Denies any injuries. No OTC meds today. He states he had similar pains when he had a heart attack previously. Denies taking blood thinners, not short of breath at this time.   BP (!) 189/93   Pulse 80   Temp 97.8  F (36.6  C)   Resp 24   Ht 1.905 m (6' 3\")   Wt 119.7 kg (264 lb)   SpO2 97%   BMI 33.00 kg/m        Triage Assessment (Adult)       Row Name 03/26/25 1727          Triage Assessment    Airway WDL WDL        Respiratory WDL    Respiratory WDL WDL        Skin Circulation/Temperature WDL    Skin Circulation/Temperature WDL WDL        Cardiac WDL    Cardiac WDL WDL        Peripheral/Neurovascular WDL    Peripheral Neurovascular WDL X        Cognitive/Neuro/Behavioral WDL    Cognitive/Neuro/Behavioral WDL WDL                     "

## 2025-03-27 LAB
ATRIAL RATE - MUSE: 71 BPM
DIASTOLIC BLOOD PRESSURE - MUSE: NORMAL MMHG
INTERPRETATION ECG - MUSE: NORMAL
P AXIS - MUSE: 31 DEGREES
PR INTERVAL - MUSE: 172 MS
QRS DURATION - MUSE: 120 MS
QT - MUSE: 400 MS
QTC - MUSE: 434 MS
R AXIS - MUSE: -50 DEGREES
SYSTOLIC BLOOD PRESSURE - MUSE: NORMAL MMHG
T AXIS - MUSE: -3 DEGREES
VENTRICULAR RATE- MUSE: 71 BPM

## 2025-03-28 NOTE — DISCHARGE INSTRUCTIONS
Please return to the emergency room if you develop worsening pain, chest pain, difficulty breathing or any new or worsening symptoms. Please call 911 if not able to drive.

## (undated) RX ORDER — HYDROMORPHONE HYDROCHLORIDE 1 MG/ML
INJECTION, SOLUTION INTRAMUSCULAR; INTRAVENOUS; SUBCUTANEOUS
Status: DISPENSED
Start: 2023-10-05

## (undated) RX ORDER — HYDROMORPHONE HYDROCHLORIDE 1 MG/ML
INJECTION, SOLUTION INTRAMUSCULAR; INTRAVENOUS; SUBCUTANEOUS
Status: DISPENSED
Start: 2021-11-04

## (undated) RX ORDER — ACETAMINOPHEN 325 MG/1
TABLET ORAL
Status: DISPENSED
Start: 2021-11-05

## (undated) RX ORDER — ONDANSETRON 2 MG/ML
INJECTION INTRAMUSCULAR; INTRAVENOUS
Status: DISPENSED
Start: 2021-11-04

## (undated) RX ORDER — SODIUM CHLORIDE 9 MG/ML
INJECTION, SOLUTION INTRAVENOUS
Status: DISPENSED
Start: 2021-11-04

## (undated) RX ORDER — KETOROLAC TROMETHAMINE 15 MG/ML
INJECTION, SOLUTION INTRAMUSCULAR; INTRAVENOUS
Status: DISPENSED
Start: 2021-11-04

## (undated) RX ORDER — CEFTRIAXONE SODIUM 1 G/50ML
INJECTION, SOLUTION INTRAVENOUS
Status: DISPENSED
Start: 2021-11-05

## (undated) RX ORDER — ONDANSETRON 2 MG/ML
INJECTION INTRAMUSCULAR; INTRAVENOUS
Status: DISPENSED
Start: 2021-11-05

## (undated) RX ORDER — ONDANSETRON 4 MG/1
TABLET, ORALLY DISINTEGRATING ORAL
Status: DISPENSED
Start: 2021-09-11

## (undated) RX ORDER — SODIUM CHLORIDE 9 MG/ML
INJECTION, SOLUTION INTRAVENOUS
Status: DISPENSED
Start: 2021-11-05